# Patient Record
Sex: MALE | Race: WHITE | Employment: FULL TIME | URBAN - METROPOLITAN AREA
[De-identification: names, ages, dates, MRNs, and addresses within clinical notes are randomized per-mention and may not be internally consistent; named-entity substitution may affect disease eponyms.]

---
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
## 2018-04-15 ENCOUNTER — HOSPITAL ENCOUNTER (EMERGENCY)
Age: 18
Discharge: HOME | End: 2018-04-15
Attending: EMERGENCY MEDICINE
Payer: COMMERCIAL

## 2018-04-15 VITALS
HEIGHT: 74 IN | DIASTOLIC BLOOD PRESSURE: 86 MMHG | TEMPERATURE: 98.7 F | RESPIRATION RATE: 17 BRPM | HEART RATE: 75 BPM | WEIGHT: 150 LBS | BODY MASS INDEX: 19.25 KG/M2 | SYSTOLIC BLOOD PRESSURE: 140 MMHG | OXYGEN SATURATION: 99 %

## 2018-04-15 DIAGNOSIS — T50.901A ACCIDENTAL DRUG OVERDOSE, INITIAL ENCOUNTER: Primary | ICD-10-CM

## 2018-04-15 LAB
ACETAMINOPHEN LEVEL: <15 UG/ML (ref 10–30)
ANION GAP SERPL CALCULATED.3IONS-SCNC: 13 MEQ/L (ref 7–13)
BUN BLDV-MCNC: 11 MG/DL (ref 6–20)
CALCIUM SERPL-MCNC: 9.9 MG/DL (ref 8.6–10.2)
CHLORIDE BLD-SCNC: 102 MEQ/L (ref 98–107)
CO2: 26 MEQ/L (ref 22–29)
CREAT SERPL-MCNC: 0.74 MG/DL (ref 0.7–1.2)
GFR AFRICAN AMERICAN: >60
GFR NON-AFRICAN AMERICAN: >60
GLUCOSE BLD-MCNC: 123 MG/DL (ref 74–109)
HCT VFR BLD CALC: 40.5 % (ref 42–52)
HEMOGLOBIN: 13.9 G/DL (ref 14–18)
MCH RBC QN AUTO: 29.9 PG (ref 27–31.3)
MCHC RBC AUTO-ENTMCNC: 34.4 % (ref 33–37)
MCV RBC AUTO: 87 FL (ref 80–100)
PDW BLD-RTO: 13.2 % (ref 11.5–14.5)
PLATELET # BLD: 245 K/UL (ref 130–400)
POTASSIUM SERPL-SCNC: 4 MEQ/L (ref 3.5–5.1)
RBC # BLD: 4.66 M/UL (ref 4.7–6.1)
SALICYLATE, SERUM: <0.3 MG/DL (ref 15–30)
SODIUM BLD-SCNC: 141 MEQ/L (ref 132–144)
WBC # BLD: 10.4 K/UL (ref 4.5–11)

## 2018-04-15 PROCEDURE — 80048 BASIC METABOLIC PNL TOTAL CA: CPT

## 2018-04-15 PROCEDURE — 36415 COLL VENOUS BLD VENIPUNCTURE: CPT

## 2018-04-15 PROCEDURE — 99284 EMERGENCY DEPT VISIT MOD MDM: CPT

## 2018-04-15 PROCEDURE — 85027 COMPLETE CBC AUTOMATED: CPT

## 2018-04-15 PROCEDURE — G0480 DRUG TEST DEF 1-7 CLASSES: HCPCS

## 2018-04-15 ASSESSMENT — ENCOUNTER SYMPTOMS
NAUSEA: 0
COUGH: 0
SHORTNESS OF BREATH: 0
VOMITING: 0

## 2018-04-16 ENCOUNTER — HOSPITAL ENCOUNTER (EMERGENCY)
Age: 18
Discharge: HOME | End: 2018-04-16
Attending: EMERGENCY MEDICINE
Payer: COMMERCIAL

## 2018-04-16 VITALS
RESPIRATION RATE: 16 BRPM | OXYGEN SATURATION: 98 % | TEMPERATURE: 98.3 F | HEART RATE: 78 BPM | HEIGHT: 74 IN | WEIGHT: 150 LBS | DIASTOLIC BLOOD PRESSURE: 66 MMHG | SYSTOLIC BLOOD PRESSURE: 118 MMHG | BODY MASS INDEX: 19.25 KG/M2

## 2018-04-16 DIAGNOSIS — J00 ACUTE NASOPHARYNGITIS: ICD-10-CM

## 2018-04-16 DIAGNOSIS — J02.9 VIRAL PHARYNGITIS: Primary | ICD-10-CM

## 2018-04-16 LAB
RAPID INFLUENZA  B AGN: NEGATIVE
RAPID INFLUENZA A AGN: NEGATIVE
S PYO AG THROAT QL: NEGATIVE

## 2018-04-16 PROCEDURE — 86403 PARTICLE AGGLUT ANTBDY SCRN: CPT

## 2018-04-16 PROCEDURE — 87077 CULTURE AEROBIC IDENTIFY: CPT

## 2018-04-16 PROCEDURE — 87880 STREP A ASSAY W/OPTIC: CPT

## 2018-04-16 PROCEDURE — 87081 CULTURE SCREEN ONLY: CPT

## 2018-04-16 PROCEDURE — 99283 EMERGENCY DEPT VISIT LOW MDM: CPT

## 2018-04-16 RX ORDER — GUAIFENESIN/DEXTROMETHORPHAN 100-10MG/5
5 SYRUP ORAL 3 TIMES DAILY PRN
Qty: 120 ML | Refills: 0 | Status: SHIPPED | OUTPATIENT
Start: 2018-04-16 | End: 2018-04-26

## 2018-04-16 ASSESSMENT — PAIN DESCRIPTION - ORIENTATION
ORIENTATION: LEFT;RIGHT;POSTERIOR
ORIENTATION_2: LEFT;RIGHT;UPPER

## 2018-04-16 ASSESSMENT — PAIN DESCRIPTION - PROGRESSION
CLINICAL_PROGRESSION: GRADUALLY WORSENING
CLINICAL_PROGRESSION_2: NOT CHANGED

## 2018-04-16 ASSESSMENT — PAIN DESCRIPTION - ONSET
ONSET: PROGRESSIVE
ONSET_2: ON-GOING

## 2018-04-16 ASSESSMENT — PAIN DESCRIPTION - LOCATION
LOCATION_2: FACE
LOCATION: HEAD
LOCATION: THROAT

## 2018-04-16 ASSESSMENT — PAIN DESCRIPTION - DESCRIPTORS
DESCRIPTORS_2: SORE
DESCRIPTORS: SORE

## 2018-04-16 ASSESSMENT — ENCOUNTER SYMPTOMS
COUGH: 1
DIARRHEA: 0
SHORTNESS OF BREATH: 0
SORE THROAT: 1
VOMITING: 0

## 2018-04-16 ASSESSMENT — PAIN DESCRIPTION - FREQUENCY: FREQUENCY: CONTINUOUS

## 2018-04-16 ASSESSMENT — PAIN SCALES - GENERAL
PAINLEVEL_OUTOF10: 5
PAINLEVEL_OUTOF10: 5

## 2018-04-16 ASSESSMENT — PAIN DESCRIPTION - DURATION: DURATION_2: CONTINUOUS

## 2018-04-16 ASSESSMENT — PAIN - FUNCTIONAL ASSESSMENT: PAIN_FUNCTIONAL_ASSESSMENT: 0-10

## 2018-04-16 ASSESSMENT — PAIN DESCRIPTION - INTENSITY: RATING_2: 5

## 2018-04-16 ASSESSMENT — PAIN DESCRIPTION - PAIN TYPE: TYPE: ACUTE PAIN

## 2018-04-18 LAB — S PYO THROAT QL CULT: NORMAL

## 2018-04-19 ENCOUNTER — HOSPITAL ENCOUNTER (EMERGENCY)
Age: 18
Discharge: HOME | End: 2018-04-20
Attending: INTERNAL MEDICINE
Payer: COMMERCIAL

## 2018-04-19 DIAGNOSIS — J01.00 ACUTE MAXILLARY SINUSITIS, RECURRENCE NOT SPECIFIED: Primary | ICD-10-CM

## 2018-04-19 DIAGNOSIS — R03.0 ELEVATED BLOOD PRESSURE READING: ICD-10-CM

## 2018-04-19 PROCEDURE — 99283 EMERGENCY DEPT VISIT LOW MDM: CPT

## 2018-04-20 ENCOUNTER — HOSPITAL ENCOUNTER (EMERGENCY)
Age: 18
Discharge: HOME | End: 2018-04-20
Attending: EMERGENCY MEDICINE
Payer: COMMERCIAL

## 2018-04-20 VITALS
HEART RATE: 110 BPM | TEMPERATURE: 98.8 F | RESPIRATION RATE: 16 BRPM | HEIGHT: 74 IN | SYSTOLIC BLOOD PRESSURE: 138 MMHG | BODY MASS INDEX: 19.25 KG/M2 | DIASTOLIC BLOOD PRESSURE: 84 MMHG | WEIGHT: 150 LBS | OXYGEN SATURATION: 99 %

## 2018-04-20 VITALS
TEMPERATURE: 97.9 F | HEIGHT: 74 IN | SYSTOLIC BLOOD PRESSURE: 130 MMHG | WEIGHT: 150 LBS | RESPIRATION RATE: 16 BRPM | DIASTOLIC BLOOD PRESSURE: 73 MMHG | HEART RATE: 88 BPM | BODY MASS INDEX: 19.25 KG/M2 | OXYGEN SATURATION: 100 %

## 2018-04-20 DIAGNOSIS — R11.0 NAUSEA: ICD-10-CM

## 2018-04-20 DIAGNOSIS — F41.1 ANXIETY STATE: Primary | ICD-10-CM

## 2018-04-20 DIAGNOSIS — J01.10 ACUTE NON-RECURRENT FRONTAL SINUSITIS: ICD-10-CM

## 2018-04-20 PROCEDURE — 6360000002 HC RX W HCPCS: Performed by: EMERGENCY MEDICINE

## 2018-04-20 PROCEDURE — 99283 EMERGENCY DEPT VISIT LOW MDM: CPT

## 2018-04-20 RX ORDER — ONDANSETRON 4 MG/1
4 TABLET, ORALLY DISINTEGRATING ORAL ONCE
Status: COMPLETED | OUTPATIENT
Start: 2018-04-20 | End: 2018-04-20

## 2018-04-20 RX ORDER — AMOXICILLIN AND CLAVULANATE POTASSIUM 500; 125 MG/1; MG/1
1 TABLET, FILM COATED ORAL 3 TIMES DAILY
Qty: 30 TABLET | Refills: 0 | Status: SHIPPED | OUTPATIENT
Start: 2018-04-20 | End: 2018-04-30

## 2018-04-20 RX ORDER — ECHINACEA PURPUREA EXTRACT 125 MG
1 TABLET ORAL PRN
Qty: 1 BOTTLE | Refills: 3 | Status: SHIPPED | OUTPATIENT
Start: 2018-04-20 | End: 2022-01-01

## 2018-04-20 RX ORDER — ONDANSETRON 4 MG/1
4 TABLET, ORALLY DISINTEGRATING ORAL EVERY 8 HOURS PRN
Qty: 8 TABLET | Refills: 0 | Status: SHIPPED | OUTPATIENT
Start: 2018-04-20 | End: 2018-04-29

## 2018-04-20 RX ORDER — FLUTICASONE PROPIONATE 50 MCG
1 SPRAY, SUSPENSION (ML) NASAL DAILY
Qty: 1 BOTTLE | Refills: 0 | Status: SHIPPED | OUTPATIENT
Start: 2018-04-20 | End: 2022-01-01

## 2018-04-20 RX ADMIN — ONDANSETRON 4 MG: 4 TABLET, ORALLY DISINTEGRATING ORAL at 20:25

## 2018-04-20 ASSESSMENT — ENCOUNTER SYMPTOMS
EYE REDNESS: 0
SINUS PRESSURE: 0
ABDOMINAL PAIN: 0
SORE THROAT: 0
DIARRHEA: 0
NAUSEA: 1
EYE DISCHARGE: 0
WHEEZING: 0
SHORTNESS OF BREATH: 0
STRIDOR: 0
FACIAL SWELLING: 0
CHEST TIGHTNESS: 0
VOMITING: 0
CHOKING: 0
BACK PAIN: 0
TROUBLE SWALLOWING: 0
COUGH: 0
EYE PAIN: 0
BLOOD IN STOOL: 0
CONSTIPATION: 0
VOICE CHANGE: 0

## 2018-04-20 ASSESSMENT — PAIN DESCRIPTION - ONSET: ONSET: GRADUAL

## 2018-04-20 ASSESSMENT — PAIN DESCRIPTION - FREQUENCY: FREQUENCY: INTERMITTENT

## 2018-04-20 ASSESSMENT — PAIN SCALES - GENERAL: PAINLEVEL_OUTOF10: 6

## 2018-04-20 ASSESSMENT — PAIN DESCRIPTION - DESCRIPTORS: DESCRIPTORS: THROBBING

## 2018-04-29 ENCOUNTER — HOSPITAL ENCOUNTER (EMERGENCY)
Age: 18
Discharge: HOME | End: 2018-04-29
Attending: EMERGENCY MEDICINE
Payer: COMMERCIAL

## 2018-04-29 VITALS
WEIGHT: 150 LBS | RESPIRATION RATE: 16 BRPM | SYSTOLIC BLOOD PRESSURE: 132 MMHG | BODY MASS INDEX: 19.25 KG/M2 | DIASTOLIC BLOOD PRESSURE: 71 MMHG | OXYGEN SATURATION: 100 % | TEMPERATURE: 97.9 F | HEIGHT: 74 IN | HEART RATE: 82 BPM

## 2018-04-29 DIAGNOSIS — Z20.2 STD EXPOSURE: Primary | ICD-10-CM

## 2018-04-29 LAB
BILIRUBIN URINE: NEGATIVE
BLOOD, URINE: NEGATIVE
CLARITY: CLEAR
COLOR: YELLOW
GLUCOSE URINE: NEGATIVE MG/DL
KETONES, URINE: NEGATIVE MG/DL
LEUKOCYTE ESTERASE, URINE: NEGATIVE
NITRITE, URINE: NEGATIVE
PH UA: 5.5 (ref 5–9)
PROTEIN UA: NEGATIVE MG/DL
SPECIFIC GRAVITY UA: >=1.03 (ref 1–1.03)
URINE REFLEX TO CULTURE: NORMAL
UROBILINOGEN, URINE: 0.2 E.U./DL

## 2018-04-29 PROCEDURE — 87591 N.GONORRHOEAE DNA AMP PROB: CPT

## 2018-04-29 PROCEDURE — 81003 URINALYSIS AUTO W/O SCOPE: CPT

## 2018-04-29 PROCEDURE — 6370000000 HC RX 637 (ALT 250 FOR IP): Performed by: EMERGENCY MEDICINE

## 2018-04-29 PROCEDURE — 2500000003 HC RX 250 WO HCPCS: Performed by: EMERGENCY MEDICINE

## 2018-04-29 PROCEDURE — 99283 EMERGENCY DEPT VISIT LOW MDM: CPT

## 2018-04-29 PROCEDURE — 87491 CHLMYD TRACH DNA AMP PROBE: CPT

## 2018-04-29 PROCEDURE — 6360000002 HC RX W HCPCS: Performed by: EMERGENCY MEDICINE

## 2018-04-29 PROCEDURE — 96372 THER/PROPH/DIAG INJ SC/IM: CPT

## 2018-04-29 RX ORDER — CEFTRIAXONE 1 G/1
INJECTION, POWDER, FOR SOLUTION INTRAMUSCULAR; INTRAVENOUS
Status: DISCONTINUED
Start: 2018-04-29 | End: 2018-04-29 | Stop reason: HOSPADM

## 2018-04-29 RX ORDER — AZITHROMYCIN 250 MG/1
1000 TABLET, FILM COATED ORAL DAILY
Status: COMPLETED | OUTPATIENT
Start: 2018-04-29 | End: 2018-04-29

## 2018-04-29 RX ADMIN — AZITHROMYCIN 1000 MG: 250 TABLET, FILM COATED ORAL at 17:07

## 2018-04-29 RX ADMIN — LIDOCAINE HYDROCHLORIDE 250 MG: 10 INJECTION, SOLUTION EPIDURAL; INFILTRATION; INTRACAUDAL; PERINEURAL at 17:14

## 2018-04-29 ASSESSMENT — ENCOUNTER SYMPTOMS
SORE THROAT: 0
APNEA: 0
ABDOMINAL DISTENTION: 0
SINUS PRESSURE: 0
ABDOMINAL PAIN: 0
DIARRHEA: 0
WHEEZING: 0
COLOR CHANGE: 0
CONSTIPATION: 0
PHOTOPHOBIA: 0
VOMITING: 0
SHORTNESS OF BREATH: 0
RHINORRHEA: 0
NAUSEA: 0
BACK PAIN: 0
EYE PAIN: 0
COUGH: 0

## 2018-05-07 LAB
C. TRACHOMATIS DNA ,URINE: NEGATIVE
N. GONORRHOEAE DNA, URINE: NEGATIVE

## 2018-07-07 ENCOUNTER — HOSPITAL ENCOUNTER (EMERGENCY)
Age: 18
Discharge: HOME | End: 2018-07-07
Attending: INTERNAL MEDICINE
Payer: COMMERCIAL

## 2018-07-07 VITALS
WEIGHT: 151 LBS | OXYGEN SATURATION: 97 % | BODY MASS INDEX: 19.38 KG/M2 | RESPIRATION RATE: 19 BRPM | DIASTOLIC BLOOD PRESSURE: 78 MMHG | HEIGHT: 74 IN | HEART RATE: 90 BPM | SYSTOLIC BLOOD PRESSURE: 124 MMHG | TEMPERATURE: 98.6 F

## 2018-07-07 DIAGNOSIS — R03.0 ELEVATED BLOOD PRESSURE READING: ICD-10-CM

## 2018-07-07 DIAGNOSIS — Z72.0 TOBACCO ABUSE: ICD-10-CM

## 2018-07-07 DIAGNOSIS — L25.9 CONTACT DERMATITIS, UNSPECIFIED CONTACT DERMATITIS TYPE, UNSPECIFIED TRIGGER: Primary | ICD-10-CM

## 2018-07-07 PROCEDURE — 99282 EMERGENCY DEPT VISIT SF MDM: CPT

## 2018-07-07 RX ORDER — DIAPER,BRIEF,INFANT-TODD,DISP
EACH MISCELLANEOUS
Qty: 1 TUBE | Refills: 1 | Status: SHIPPED | OUTPATIENT
Start: 2018-07-07 | End: 2018-07-14

## 2018-07-07 ASSESSMENT — PAIN DESCRIPTION - LOCATION: LOCATION: ARM

## 2018-07-07 ASSESSMENT — PAIN DESCRIPTION - PAIN TYPE: TYPE: ACUTE PAIN

## 2018-07-07 ASSESSMENT — PAIN DESCRIPTION - FREQUENCY: FREQUENCY: INTERMITTENT

## 2018-07-07 ASSESSMENT — PAIN SCALES - GENERAL: PAINLEVEL_OUTOF10: 2

## 2018-07-07 ASSESSMENT — PAIN DESCRIPTION - ONSET: ONSET: ON-GOING

## 2018-07-07 ASSESSMENT — PAIN DESCRIPTION - PROGRESSION: CLINICAL_PROGRESSION: NOT CHANGED

## 2018-07-07 ASSESSMENT — PAIN DESCRIPTION - ORIENTATION: ORIENTATION: RIGHT;LEFT

## 2018-07-07 NOTE — ED NOTES
Rash to arms of unknown origin     [address], [address] [place] [address]  07/07/18 1702

## 2018-07-07 NOTE — ED PROVIDER NOTES
reviewed and are negative. Except as noted above the remainder of the review of systems was reviewed and negative. PAST MEDICAL HISTORY     Past Medical History:   Diagnosis Date    Depression     Immune deficiency disorder ([facility]          SURGICAL HISTORY     History reviewed. No pertinent surgical history. CURRENT MEDICATIONS       Discharge Medication List as of 7/7/2018  5:38 PM      CONTINUE these medications which have NOT CHANGED    Details   fluticasone (FLONASE) 50 MCG/ACT nasal spray 1 spray by Nasal route daily, Disp-1 Bottle, R-0Print      sodium chloride (OCEAN) 0.65 % nasal spray 1 spray by Nasal route as needed for Congestion, Disp-1 Bottle, R-3Print      amphetamine-dextroamphetamine (ADDERALL, 20MG,) 20 MG tablet Take 20 mg by mouth daily . Historical Med      loratadine-pseudoephedrine (CLARITIN-D 12HR) 5-120 MG per extended release tablet Take 1 tablet by mouth 2 times daily, Disp-20 tablet, R-0Print             ALLERGIES     Patient has no known allergies. FAMILY HISTORY     History reviewed. No pertinent family history. SOCIAL HISTORY       Social History     Social History    Marital status: Single     Spouse name: N/A    Number of children: N/A    Years of education: N/A     Social History Main Topics    Smoking status: Current Every Day Smoker     Packs/day: 0.25     Years: 5.00     Types: E-Cigarettes    Smokeless tobacco: Never Used    Alcohol use No    Drug use: Yes     Types:  Other-see comments      Comment: \"maddi\" MDMA    Sexual activity: Yes     Partners: Female     Other Topics Concern    None     Social History Narrative    None       SCREENINGS             PHYSICAL EXAM    (up to 7 for level 4, 8 or more for level 5)     ED Triage Vitals [07/07/18 1725]   BP Temp Temp Source Heart Rate Resp SpO2 Height Weight   136/65 98.5 °F (36.9 °C) Oral 91 19 97 % 6' 2\" (1.88 m) 151 lb (68.5 kg)       Physical Exam  Physical Exam   Constitutional:  Appears well-developed and well-nourished. No distress noted. Non toxic in appearance  HENT:     Head: Normocephalic and atraumatic. Mouth/Throat: Oropharynx is clear and mucosa moist.Scattered decay but gingiva are normal in appearance without any evidence of bleeding or petechiae. No oropharyngeal exudate noted. Eyes: Conjunctivae and EOM are normal. No petechiae identified. Pupils are equal, round, and reactive to light. No scleral icterus. Neck: Normal range of motion. Neck supple. No tracheal deviation present. Cardiovascular: Normal rate, regular rhythm, normal heart sounds and intact distal pulses. Exam reveals no gallop or friction rub. No murmur heard. Pulmonary/Chest: Effort normal and breath sounds are symmetric and normal. No respiratory distress. There are no wheezes, rales or rhonchi. No tenderness is exhibited upon palpation of the chest wall. Abdominal: Soft. Bowel sounds are normal. No distension or no mass exhibitted. There is no tenderness, rebound, rigidity or guarding. Genitourinary:   No CVA tenderness   Musculoskeletal: Normal range of motion. No edema, tenderness or deformity. Lymphadenopathy:  No cervical adenopathy. Neurological:   alert and oriented to person, place, and time. Reflexes are normal.  There are no cranial nerve deficits. Normal muscle tone exhibitted. Coordination normal.   Skin: Slightly raised blanching patches of papules on the right proximal forearm, dorsum of the right hand and bilateral wrists, without drainage, bleeding or other exudate. Skin is warm and dry. Multiple simple superficial linear scratches on the dorsum of his bilateral hands. No diaphoresis. No erythema. No pallor. no petechiae or purpura noted. Psychiatric:  normal mood and affect.  Behavior is  normal. Judgment and thought content normal.     DIAGNOSTIC RESULTS     EKG: All EKG's are interpreted by the Emergency Department Physician who either signs or Co-signs this chart in the absence

## 2018-10-06 ENCOUNTER — HOSPITAL ENCOUNTER (EMERGENCY)
Age: 18
Discharge: HOME | End: 2018-10-06
Attending: EMERGENCY MEDICINE
Payer: COMMERCIAL

## 2018-10-06 VITALS
HEIGHT: 74 IN | RESPIRATION RATE: 18 BRPM | HEART RATE: 65 BPM | DIASTOLIC BLOOD PRESSURE: 68 MMHG | OXYGEN SATURATION: 98 % | WEIGHT: 150 LBS | BODY MASS INDEX: 19.25 KG/M2 | TEMPERATURE: 98.6 F | SYSTOLIC BLOOD PRESSURE: 124 MMHG

## 2018-10-06 DIAGNOSIS — R51.9 SINUS HEADACHE: Primary | ICD-10-CM

## 2018-10-06 LAB
AMPHETAMINE SCREEN, URINE: NORMAL
BARBITURATE SCREEN URINE: NORMAL
BENZODIAZEPINE SCREEN, URINE: NORMAL
BILIRUBIN URINE: NEGATIVE
BLOOD, URINE: NEGATIVE
CANNABINOID SCREEN URINE: NORMAL
CLARITY: CLEAR
COCAINE METABOLITE SCREEN URINE: NORMAL
COLOR: YELLOW
GLUCOSE URINE: NEGATIVE MG/DL
KETONES, URINE: NEGATIVE MG/DL
LEUKOCYTE ESTERASE, URINE: NEGATIVE
Lab: NORMAL
NITRITE, URINE: NEGATIVE
OPIATE SCREEN URINE: NORMAL
PH UA: 6 (ref 5–9)
PHENCYCLIDINE SCREEN URINE: NORMAL
PROTEIN UA: NEGATIVE MG/DL
SPECIFIC GRAVITY UA: >=1.03 (ref 1–1.03)
TRICYCLIC, URINE: NORMAL
URINE REFLEX TO CULTURE: NORMAL
UROBILINOGEN, URINE: 0.2 E.U./DL

## 2018-10-06 PROCEDURE — 6370000000 HC RX 637 (ALT 250 FOR IP): Performed by: EMERGENCY MEDICINE

## 2018-10-06 PROCEDURE — 99284 EMERGENCY DEPT VISIT MOD MDM: CPT

## 2018-10-06 PROCEDURE — 81003 URINALYSIS AUTO W/O SCOPE: CPT

## 2018-10-06 PROCEDURE — 80306 DRUG TEST PRSMV INSTRMNT: CPT

## 2018-10-06 PROCEDURE — 6360000002 HC RX W HCPCS: Performed by: EMERGENCY MEDICINE

## 2018-10-06 RX ORDER — CETIRIZINE HYDROCHLORIDE 10 MG/1
10 TABLET ORAL DAILY
Status: DISCONTINUED | OUTPATIENT
Start: 2018-10-06 | End: 2018-10-06

## 2018-10-06 RX ORDER — ONDANSETRON 4 MG/1
4 TABLET, ORALLY DISINTEGRATING ORAL ONCE
Status: COMPLETED | OUTPATIENT
Start: 2018-10-06 | End: 2018-10-06

## 2018-10-06 RX ORDER — CETIRIZINE HYDROCHLORIDE 10 MG/1
10 TABLET ORAL ONCE
Status: COMPLETED | OUTPATIENT
Start: 2018-10-06 | End: 2018-10-06

## 2018-10-06 RX ORDER — IBUPROFEN 400 MG/1
800 TABLET ORAL ONCE
Status: COMPLETED | OUTPATIENT
Start: 2018-10-06 | End: 2018-10-06

## 2018-10-06 RX ORDER — BUTALBITAL, ASPIRIN, AND CAFFEINE 325; 50; 40 MG/1; MG/1; MG/1
1 CAPSULE ORAL EVERY 4 HOURS PRN
Qty: 12 CAPSULE | Refills: 0 | Status: SHIPPED | OUTPATIENT
Start: 2018-10-06 | End: 2018-10-09

## 2018-10-06 RX ADMIN — CETIRIZINE HYDROCHLORIDE 10 MG: 10 TABLET, FILM COATED ORAL at 03:22

## 2018-10-06 RX ADMIN — ONDANSETRON 4 MG: 4 TABLET, ORALLY DISINTEGRATING ORAL at 03:22

## 2018-10-06 RX ADMIN — IBUPROFEN 800 MG: 400 TABLET, FILM COATED ORAL at 03:22

## 2018-10-06 ASSESSMENT — ENCOUNTER SYMPTOMS
EYE PAIN: 0
CHOKING: 0
EYE DISCHARGE: 0
COUGH: 0
ABDOMINAL DISTENTION: 0
VOMITING: 1
CHEST TIGHTNESS: 0
PHOTOPHOBIA: 0
ABDOMINAL PAIN: 0
NAUSEA: 0
TROUBLE SWALLOWING: 0
BACK PAIN: 0
DIARRHEA: 0
BLOOD IN STOOL: 0
CONSTIPATION: 0
EYE REDNESS: 0
SINUS PRESSURE: 0
COLOR CHANGE: 0
SORE THROAT: 0
SHORTNESS OF BREATH: 0
ANAL BLEEDING: 0
FACIAL SWELLING: 0
VOICE CHANGE: 0
EYE ITCHING: 0
WHEEZING: 0
STRIDOR: 0
RHINORRHEA: 0

## 2018-10-06 ASSESSMENT — PAIN DESCRIPTION - LOCATION: LOCATION: HEAD

## 2018-10-06 ASSESSMENT — PAIN SCALES - GENERAL
PAINLEVEL_OUTOF10: 0
PAINLEVEL_OUTOF10: 5

## 2018-10-06 ASSESSMENT — PAIN DESCRIPTION - FREQUENCY
FREQUENCY: CONTINUOUS
FREQUENCY: INTERMITTENT

## 2018-10-06 ASSESSMENT — PAIN DESCRIPTION - PAIN TYPE: TYPE: ACUTE PAIN

## 2018-10-06 ASSESSMENT — PAIN DESCRIPTION - DESCRIPTORS: DESCRIPTORS: THROBBING

## 2018-11-09 ENCOUNTER — HOSPITAL ENCOUNTER (EMERGENCY)
Age: 18
Discharge: HOME | End: 2018-11-09
Attending: EMERGENCY MEDICINE
Payer: COMMERCIAL

## 2018-11-09 VITALS
WEIGHT: 143 LBS | HEART RATE: 87 BPM | SYSTOLIC BLOOD PRESSURE: 120 MMHG | BODY MASS INDEX: 18.95 KG/M2 | OXYGEN SATURATION: 99 % | RESPIRATION RATE: 16 BRPM | DIASTOLIC BLOOD PRESSURE: 77 MMHG | HEIGHT: 73 IN | TEMPERATURE: 98.1 F

## 2018-11-09 DIAGNOSIS — H52.13 MYOPIA OF BOTH EYES: ICD-10-CM

## 2018-11-09 DIAGNOSIS — R51.9 INTRACTABLE EPISODIC HEADACHE, UNSPECIFIED HEADACHE TYPE: Primary | ICD-10-CM

## 2018-11-09 PROCEDURE — 99283 EMERGENCY DEPT VISIT LOW MDM: CPT

## 2018-11-09 ASSESSMENT — ENCOUNTER SYMPTOMS
CHEST TIGHTNESS: 0
ABDOMINAL PAIN: 0
EYE PAIN: 0
SORE THROAT: 0
CONSTIPATION: 0
FACIAL SWELLING: 0
EYE DISCHARGE: 0
EYE REDNESS: 0
CHOKING: 0
STRIDOR: 0
DIARRHEA: 0
WHEEZING: 0
BACK PAIN: 0
VOICE CHANGE: 0
SINUS PRESSURE: 0
BLOOD IN STOOL: 0
COUGH: 0
TROUBLE SWALLOWING: 0
VOMITING: 0
SHORTNESS OF BREATH: 0

## 2018-11-09 NOTE — ED PROVIDER NOTES
none    LABS:  Labs Reviewed - No data to display    All other labs were within normal range or not returned as of this dictation. EMERGENCY DEPARTMENT COURSE and DIFFERENTIAL DIAGNOSIS/MDM:   Vitals:    Vitals:    11/09/18 1705   BP: 120/77   Pulse: 87   Resp: 16   Temp: 98.1 °F (36.7 °C)   TempSrc: Oral   SpO2: 99%   Weight: 143 lb (64.9 kg)   Height: 6' 1\" (1.854 m)         MDM  Number of Diagnoses or Management Options  Intractable episodic headache, unspecified headache type: established and improving  Myopia of both eyes:   Diagnosis management comments: Patient about hemoglobin level checked and it is negative at this time no exposure      CRITICAL CARE TIME   Total Critical Care time was  minutes, excluding separately reportableprocedures. There was a high probability of clinicallysignificant/life threatening deterioration in the patient's condition which required my urgent intervention. CONSULTS:  None    PROCEDURES:  Unless otherwise noted below, none     Procedures    FINAL IMPRESSION      1. Intractable episodic headache, unspecified headache type    2.  Myopia of both eyes          DISPOSITION/PLAN   DISPOSITION Decision To Discharge 11/09/2018 05:21:51 PM      PATIENT REFERRED TO:  Chelita Francis MD  [address] [address]  [phone]    In 1 week        DISCHARGE MEDICATIONS:  New Prescriptions    No medications on file          (Please note that portions of this note were completed with a voice recognition program.  Efforts were made to edit the dictations but occasionally words are mis-transcribed.)    Dl Serna MD (electronically signed)  Attending Emergency Physician       Dl Serna MD  11/09/18 3845

## 2019-01-04 ENCOUNTER — HOSPITAL ENCOUNTER (EMERGENCY)
Age: 19
Discharge: HOME | End: 2019-01-04
Attending: EMERGENCY MEDICINE
Payer: COMMERCIAL

## 2019-01-04 VITALS
OXYGEN SATURATION: 97 % | HEIGHT: 74 IN | DIASTOLIC BLOOD PRESSURE: 68 MMHG | HEART RATE: 103 BPM | WEIGHT: 152 LBS | RESPIRATION RATE: 20 BRPM | SYSTOLIC BLOOD PRESSURE: 126 MMHG | BODY MASS INDEX: 19.51 KG/M2 | TEMPERATURE: 98.6 F

## 2019-01-04 DIAGNOSIS — S61.212A LACERATION OF RIGHT MIDDLE FINGER WITHOUT FOREIGN BODY WITHOUT DAMAGE TO NAIL, INITIAL ENCOUNTER: Primary | ICD-10-CM

## 2019-01-04 PROCEDURE — 6360000002 HC RX W HCPCS: Performed by: EMERGENCY MEDICINE

## 2019-01-04 PROCEDURE — 90471 IMMUNIZATION ADMIN: CPT | Performed by: EMERGENCY MEDICINE

## 2019-01-04 PROCEDURE — 6370000000 HC RX 637 (ALT 250 FOR IP): Performed by: EMERGENCY MEDICINE

## 2019-01-04 PROCEDURE — 99282 EMERGENCY DEPT VISIT SF MDM: CPT

## 2019-01-04 PROCEDURE — 90715 TDAP VACCINE 7 YRS/> IM: CPT | Performed by: EMERGENCY MEDICINE

## 2019-01-04 PROCEDURE — 12001 RPR S/N/AX/GEN/TRNK 2.5CM/<: CPT

## 2019-01-04 RX ORDER — IBUPROFEN 600 MG/1
600 TABLET ORAL EVERY 6 HOURS PRN
Qty: 30 TABLET | Refills: 0 | Status: SHIPPED | OUTPATIENT
Start: 2019-01-04 | End: 2022-01-23 | Stop reason: SDUPTHER

## 2019-01-04 RX ORDER — GINSENG 100 MG
CAPSULE ORAL ONCE
Status: COMPLETED | OUTPATIENT
Start: 2019-01-04 | End: 2019-01-04

## 2019-01-04 RX ADMIN — BACITRACIN: 500 OINTMENT TOPICAL at 12:19

## 2019-01-04 RX ADMIN — TETANUS TOXOID, REDUCED DIPHTHERIA TOXOID AND ACELLULAR PERTUSSIS VACCINE, ADSORBED 0.5 ML: 5; 2.5; 8; 8; 2.5 SUSPENSION INTRAMUSCULAR at 12:19

## 2019-01-04 ASSESSMENT — ENCOUNTER SYMPTOMS
COUGH: 0
CONSTIPATION: 0
BACK PAIN: 0
VOICE CHANGE: 0
CHOKING: 0
SORE THROAT: 0
TROUBLE SWALLOWING: 0
SHORTNESS OF BREATH: 0
BLOOD IN STOOL: 0
WHEEZING: 0
EYE REDNESS: 0
EYE PAIN: 0
DIARRHEA: 0
VOMITING: 0
SINUS PRESSURE: 0
FACIAL SWELLING: 0
STRIDOR: 0
ABDOMINAL PAIN: 0
CHEST TIGHTNESS: 0
EYE DISCHARGE: 0

## 2019-01-08 ENCOUNTER — OFFICE VISIT (OUTPATIENT)
Dept: FAMILY MEDICINE CLINIC | Age: 19
End: 2019-01-08
Payer: COMMERCIAL

## 2019-01-08 VITALS
RESPIRATION RATE: 20 BRPM | DIASTOLIC BLOOD PRESSURE: 70 MMHG | SYSTOLIC BLOOD PRESSURE: 114 MMHG | HEART RATE: 87 BPM | BODY MASS INDEX: 20.53 KG/M2 | OXYGEN SATURATION: 98 % | HEIGHT: 74 IN | TEMPERATURE: 97 F | WEIGHT: 160 LBS

## 2019-01-08 DIAGNOSIS — R10.13 EPIGASTRIC PAIN: Primary | ICD-10-CM

## 2019-01-08 DIAGNOSIS — R07.9 CHEST PAIN, UNSPECIFIED TYPE: ICD-10-CM

## 2019-01-08 DIAGNOSIS — K92.1 BLOOD IN STOOL: ICD-10-CM

## 2019-01-08 PROCEDURE — 99213 OFFICE O/P EST LOW 20 MIN: CPT | Performed by: NURSE PRACTITIONER

## 2019-01-08 RX ORDER — PANTOPRAZOLE SODIUM 20 MG/1
40 TABLET, DELAYED RELEASE ORAL DAILY
Qty: 30 TABLET | Refills: 0 | Status: SHIPPED | OUTPATIENT
Start: 2019-01-08 | End: 2022-01-01

## 2019-01-08 ASSESSMENT — ENCOUNTER SYMPTOMS
SHORTNESS OF BREATH: 0
WHEEZING: 0
COUGH: 0
BLOOD IN STOOL: 1
CONSTIPATION: 1
ABDOMINAL PAIN: 1
NAUSEA: 0
VOMITING: 0
ANAL BLEEDING: 1
DIARRHEA: 0

## 2019-01-08 ASSESSMENT — PATIENT HEALTH QUESTIONNAIRE - PHQ9
2. FEELING DOWN, DEPRESSED OR HOPELESS: 0
SUM OF ALL RESPONSES TO PHQ QUESTIONS 1-9: 0
1. LITTLE INTEREST OR PLEASURE IN DOING THINGS: 0
SUM OF ALL RESPONSES TO PHQ QUESTIONS 1-9: 0
SUM OF ALL RESPONSES TO PHQ9 QUESTIONS 1 & 2: 0

## 2019-01-12 ENCOUNTER — HOSPITAL ENCOUNTER (EMERGENCY)
Age: 19
Discharge: HOME | End: 2019-01-12
Attending: EMERGENCY MEDICINE
Payer: COMMERCIAL

## 2019-01-12 ENCOUNTER — APPOINTMENT (OUTPATIENT)
Dept: GENERAL RADIOLOGY | Age: 19
End: 2019-01-12
Payer: COMMERCIAL

## 2019-01-12 VITALS
DIASTOLIC BLOOD PRESSURE: 80 MMHG | RESPIRATION RATE: 18 BRPM | TEMPERATURE: 98 F | HEIGHT: 74 IN | BODY MASS INDEX: 20.53 KG/M2 | SYSTOLIC BLOOD PRESSURE: 126 MMHG | WEIGHT: 160 LBS | HEART RATE: 78 BPM | OXYGEN SATURATION: 98 %

## 2019-01-12 DIAGNOSIS — K29.00 ACUTE GASTRITIS WITHOUT HEMORRHAGE, UNSPECIFIED GASTRITIS TYPE: ICD-10-CM

## 2019-01-12 DIAGNOSIS — R10.84 GENERALIZED ABDOMINAL PAIN: Primary | ICD-10-CM

## 2019-01-12 LAB
ALBUMIN SERPL-MCNC: 4.7 G/DL (ref 3.9–4.9)
ALP BLD-CCNC: 80 U/L (ref 35–104)
ALT SERPL-CCNC: 21 U/L (ref 0–41)
AMPHETAMINE SCREEN, URINE: NORMAL
AMYLASE: 66 U/L (ref 28–100)
ANION GAP SERPL CALCULATED.3IONS-SCNC: 11 MEQ/L (ref 7–13)
AST SERPL-CCNC: 22 U/L (ref 0–40)
BACTERIA: NORMAL /HPF
BARBITURATE SCREEN URINE: NORMAL
BASOPHILS ABSOLUTE: 0 K/UL (ref 0–0.2)
BASOPHILS RELATIVE PERCENT: 0.2 %
BENZODIAZEPINE SCREEN, URINE: NORMAL
BILIRUB SERPL-MCNC: 0.5 MG/DL (ref 0–1.2)
BILIRUBIN URINE: NEGATIVE
BLOOD, URINE: NORMAL
BUN BLDV-MCNC: 11 MG/DL (ref 6–20)
CALCIUM SERPL-MCNC: 9.6 MG/DL (ref 8.6–10.2)
CANNABINOID SCREEN URINE: NORMAL
CHLORIDE BLD-SCNC: 100 MEQ/L (ref 98–107)
CLARITY: CLEAR
CO2: 29 MEQ/L (ref 22–29)
COCAINE METABOLITE SCREEN URINE: NORMAL
COLOR: YELLOW
CREAT SERPL-MCNC: 0.84 MG/DL (ref 0.7–1.2)
EOSINOPHILS ABSOLUTE: 0 K/UL (ref 0–0.7)
EOSINOPHILS RELATIVE PERCENT: 0.5 %
EPITHELIAL CELLS, UA: NORMAL /HPF
GFR AFRICAN AMERICAN: >60
GFR NON-AFRICAN AMERICAN: >60
GLOBULIN: 3 G/DL (ref 2.3–3.5)
GLUCOSE BLD-MCNC: 101 MG/DL (ref 74–109)
GLUCOSE URINE: NEGATIVE MG/DL
HCT VFR BLD CALC: 43.5 % (ref 42–52)
HEMOGLOBIN: 14.6 G/DL (ref 14–18)
KETONES, URINE: NEGATIVE MG/DL
LEUKOCYTE ESTERASE, URINE: NEGATIVE
LIPASE: 20 U/L (ref 13–60)
LYMPHOCYTES ABSOLUTE: 0.8 K/UL (ref 1–4.8)
LYMPHOCYTES RELATIVE PERCENT: 13.6 %
Lab: NORMAL
MCH RBC QN AUTO: 28.8 PG (ref 27–31.3)
MCHC RBC AUTO-ENTMCNC: 33.6 % (ref 33–37)
MCV RBC AUTO: 85.7 FL (ref 80–100)
MONOCYTES ABSOLUTE: 0.8 K/UL (ref 0.2–0.8)
MONOCYTES RELATIVE PERCENT: 13.2 %
NEUTROPHILS ABSOLUTE: 4.2 K/UL (ref 1.4–6.5)
NEUTROPHILS RELATIVE PERCENT: 72.5 %
NITRITE, URINE: NEGATIVE
OPIATE SCREEN URINE: NORMAL
PDW BLD-RTO: 13.3 % (ref 11.5–14.5)
PH UA: 5.5 (ref 5–9)
PHENCYCLIDINE SCREEN URINE: NORMAL
PLATELET # BLD: 203 K/UL (ref 130–400)
POTASSIUM SERPL-SCNC: 4.3 MEQ/L (ref 3.5–5.1)
PROTEIN UA: NEGATIVE MG/DL
RBC # BLD: 5.08 M/UL (ref 4.7–6.1)
RBC UA: NORMAL /HPF (ref 0–2)
SODIUM BLD-SCNC: 140 MEQ/L (ref 132–144)
SPECIFIC GRAVITY UA: 1.02 (ref 1–1.03)
TOTAL PROTEIN: 7.7 G/DL (ref 6.4–8.1)
TRICYCLIC, URINE: NORMAL
URINE REFLEX TO CULTURE: YES
UROBILINOGEN, URINE: 0.2 E.U./DL
WBC # BLD: 5.8 K/UL (ref 4.5–11)
WBC UA: NORMAL /HPF (ref 0–5)

## 2019-01-12 PROCEDURE — 81001 URINALYSIS AUTO W/SCOPE: CPT

## 2019-01-12 PROCEDURE — 82150 ASSAY OF AMYLASE: CPT

## 2019-01-12 PROCEDURE — 80306 DRUG TEST PRSMV INSTRMNT: CPT

## 2019-01-12 PROCEDURE — 2580000003 HC RX 258: Performed by: EMERGENCY MEDICINE

## 2019-01-12 PROCEDURE — 74022 RADEX COMPL AQT ABD SERIES: CPT

## 2019-01-12 PROCEDURE — 99284 EMERGENCY DEPT VISIT MOD MDM: CPT

## 2019-01-12 PROCEDURE — 96374 THER/PROPH/DIAG INJ IV PUSH: CPT

## 2019-01-12 PROCEDURE — 85025 COMPLETE CBC W/AUTO DIFF WBC: CPT

## 2019-01-12 PROCEDURE — S0028 INJECTION, FAMOTIDINE, 20 MG: HCPCS | Performed by: EMERGENCY MEDICINE

## 2019-01-12 PROCEDURE — 96375 TX/PRO/DX INJ NEW DRUG ADDON: CPT

## 2019-01-12 PROCEDURE — 87086 URINE CULTURE/COLONY COUNT: CPT

## 2019-01-12 PROCEDURE — 80053 COMPREHEN METABOLIC PANEL: CPT

## 2019-01-12 PROCEDURE — 83690 ASSAY OF LIPASE: CPT

## 2019-01-12 PROCEDURE — 6360000002 HC RX W HCPCS: Performed by: EMERGENCY MEDICINE

## 2019-01-12 RX ORDER — ONDANSETRON 4 MG/1
4 TABLET, ORALLY DISINTEGRATING ORAL EVERY 8 HOURS PRN
Qty: 10 TABLET | Refills: 0 | Status: SHIPPED | OUTPATIENT
Start: 2019-01-12 | End: 2022-01-01 | Stop reason: SDUPTHER

## 2019-01-12 RX ORDER — 0.9 % SODIUM CHLORIDE 0.9 %
1000 INTRAVENOUS SOLUTION INTRAVENOUS ONCE
Status: COMPLETED | OUTPATIENT
Start: 2019-01-12 | End: 2019-01-12

## 2019-01-12 RX ORDER — SODIUM CHLORIDE 0.9 % (FLUSH) 0.9 %
3 SYRINGE (ML) INJECTION EVERY 8 HOURS
Status: DISCONTINUED | OUTPATIENT
Start: 2019-01-12 | End: 2019-01-12 | Stop reason: HOSPADM

## 2019-01-12 RX ORDER — OMEPRAZOLE 20 MG/1
20 CAPSULE, DELAYED RELEASE ORAL DAILY
Qty: 30 CAPSULE | Refills: 0 | Status: SHIPPED | OUTPATIENT
Start: 2019-01-12 | End: 2022-01-01

## 2019-01-12 RX ORDER — DIPHENHYDRAMINE HYDROCHLORIDE 50 MG/ML
25 INJECTION INTRAMUSCULAR; INTRAVENOUS ONCE
Status: COMPLETED | OUTPATIENT
Start: 2019-01-12 | End: 2019-01-12

## 2019-01-12 RX ORDER — METOCLOPRAMIDE HYDROCHLORIDE 5 MG/ML
10 INJECTION INTRAMUSCULAR; INTRAVENOUS ONCE
Status: COMPLETED | OUTPATIENT
Start: 2019-01-12 | End: 2019-01-12

## 2019-01-12 RX ADMIN — SODIUM CHLORIDE 1000 ML: 9 INJECTION, SOLUTION INTRAVENOUS at 18:54

## 2019-01-12 RX ADMIN — DIPHENHYDRAMINE HYDROCHLORIDE 25 MG: 50 INJECTION INTRAMUSCULAR; INTRAVENOUS at 18:54

## 2019-01-12 RX ADMIN — Medication 3 ML: at 18:55

## 2019-01-12 RX ADMIN — FAMOTIDINE 20 MG: 10 INJECTION, SOLUTION INTRAVENOUS at 18:53

## 2019-01-12 RX ADMIN — METOCLOPRAMIDE 10 MG: 5 INJECTION, SOLUTION INTRAMUSCULAR; INTRAVENOUS at 18:54

## 2019-01-12 ASSESSMENT — ENCOUNTER SYMPTOMS
BLOOD IN STOOL: 0
CHOKING: 0
EYE DISCHARGE: 0
COUGH: 0
CHEST TIGHTNESS: 0
VOMITING: 0
SHORTNESS OF BREATH: 0
STRIDOR: 0
SINUS PRESSURE: 0
SORE THROAT: 0
TROUBLE SWALLOWING: 0
CONSTIPATION: 0
BACK PAIN: 0
EYE REDNESS: 0
EYE PAIN: 0
WHEEZING: 0
FACIAL SWELLING: 0
DIARRHEA: 0
VOICE CHANGE: 0
NAUSEA: 1
ABDOMINAL PAIN: 1

## 2019-01-12 ASSESSMENT — PAIN DESCRIPTION - DESCRIPTORS: DESCRIPTORS: ACHING

## 2019-01-12 ASSESSMENT — PAIN DESCRIPTION - LOCATION: LOCATION: ABDOMEN

## 2019-01-12 ASSESSMENT — PAIN SCALES - GENERAL: PAINLEVEL_OUTOF10: 4

## 2019-01-14 LAB — URINE CULTURE, ROUTINE: NORMAL

## 2022-01-01 ENCOUNTER — HOSPITAL ENCOUNTER (EMERGENCY)
Age: 22
Discharge: HOME | End: 2022-01-01
Attending: EMERGENCY MEDICINE
Payer: COMMERCIAL

## 2022-01-01 VITALS
RESPIRATION RATE: 18 BRPM | BODY MASS INDEX: 21.82 KG/M2 | SYSTOLIC BLOOD PRESSURE: 134 MMHG | HEIGHT: 74 IN | HEART RATE: 67 BPM | WEIGHT: 170 LBS | DIASTOLIC BLOOD PRESSURE: 79 MMHG | TEMPERATURE: 97.1 F | OXYGEN SATURATION: 99 %

## 2022-01-01 DIAGNOSIS — R11.2 NAUSEA, VOMITING AND DIARRHEA: Primary | ICD-10-CM

## 2022-01-01 DIAGNOSIS — R19.7 NAUSEA, VOMITING AND DIARRHEA: Primary | ICD-10-CM

## 2022-01-01 LAB
INFLUENZA A BY PCR: NEGATIVE
INFLUENZA B BY PCR: NEGATIVE

## 2022-01-01 PROCEDURE — 99283 EMERGENCY DEPT VISIT LOW MDM: CPT

## 2022-01-01 PROCEDURE — 87502 INFLUENZA DNA AMP PROBE: CPT

## 2022-01-01 PROCEDURE — 6370000000 HC RX 637 (ALT 250 FOR IP): Performed by: EMERGENCY MEDICINE

## 2022-01-01 RX ORDER — ONDANSETRON 4 MG/1
4 TABLET, ORALLY DISINTEGRATING ORAL EVERY 8 HOURS PRN
Qty: 20 TABLET | Refills: 0 | Status: SHIPPED | OUTPATIENT
Start: 2022-01-01 | End: 2022-01-08

## 2022-01-01 RX ORDER — DICYCLOMINE HYDROCHLORIDE 10 MG/1
10 CAPSULE ORAL EVERY 6 HOURS PRN
Qty: 20 CAPSULE | Refills: 0 | Status: SHIPPED | OUTPATIENT
Start: 2022-01-01 | End: 2022-01-06

## 2022-01-01 RX ORDER — FAMOTIDINE 20 MG/1
20 TABLET, FILM COATED ORAL 2 TIMES DAILY
Qty: 60 TABLET | Refills: 0 | Status: SHIPPED | OUTPATIENT
Start: 2022-01-01 | End: 2022-01-31

## 2022-01-01 RX ORDER — ONDANSETRON 4 MG/1
4 TABLET, ORALLY DISINTEGRATING ORAL ONCE
Status: COMPLETED | OUTPATIENT
Start: 2022-01-01 | End: 2022-01-01

## 2022-01-01 RX ADMIN — ONDANSETRON 4 MG: 4 TABLET, ORALLY DISINTEGRATING ORAL at 15:05

## 2022-01-01 ASSESSMENT — PAIN DESCRIPTION - DESCRIPTORS: DESCRIPTORS: ACHING

## 2022-01-01 ASSESSMENT — PAIN DESCRIPTION - LOCATION: LOCATION: CHEST

## 2022-01-01 ASSESSMENT — PAIN DESCRIPTION - PAIN TYPE: TYPE: ACUTE PAIN

## 2022-01-01 ASSESSMENT — PAIN SCALES - GENERAL: PAINLEVEL_OUTOF10: 6

## 2022-01-01 NOTE — ED PROVIDER NOTES
eMERGENCY dEPARTMENT eNCOUnter      [facility]    Chief Complaint   Patient presents with    Other     Patient is 3 weeks post positive COVID. Patient states the new symptoms started yesterday and states that his chest feels like a ache. Patient also states that he feels like his heart races once in a while.  Abdominal Pain    Leg Pain       HPI    Leroy Doyle is a 19 y.o. male with history of anxiety, depression, immune deficiency disorder who presentsto ED from home  By  private carWith complaint of nausea vomiting diarrhea  Onset 1 day  Intensity of symptoms mild  Patient was diagnosed with Covid 2 weeks ago. Patient started having nausea vomiting diarrhea starting last night. Patient denies any fever or chills. Patient denies any shortness of breath or chest pain. There is no blood in the vomitus or stools. Patient denies any recent travel      PAST MEDICAL HISTORY    Past Medical History:   Diagnosis Date    Anxiety     Depression     Immune deficiency disorder ([facility]     Willebrand-Juergens disease ([facility]        SURGICAL HISTORY    History reviewed. No pertinent surgical history. CURRENT MEDICATIONS    Current Outpatient Rx   Medication Sig Dispense Refill    dicyclomine (BENTYL) 10 MG capsule Take 1 capsule by mouth every 6 hours as needed (cramps) 20 capsule 0    famotidine (PEPCID) 20 MG tablet Take 1 tablet by mouth 2 times daily 60 tablet 0    ondansetron (ZOFRAN-ODT) 4 MG disintegrating tablet Place 1 tablet under the tongue every 8 hours as needed for Nausea or Vomiting May Sub regular tablet (non-ODT) if insurance does not cover ODT. 20 tablet 0    amphetamine-dextroamphetamine (ADDERALL, 20MG,) 20 MG tablet Take 20 mg by mouth daily .       ibuprofen (IBU) 600 MG tablet Take 1 tablet by mouth every 6 hours as needed for Pain 30 tablet 0    butalbital-aspirin-caffeine (FIORINAL) -40 MG capsule Take 1 capsule by mouth every 4 hours as needed for Headaches for up to 3 days.. 12 capsule 0    loratadine-pseudoephedrine (CLARITIN-D 12HR) 5-120 MG per extended release tablet Take 1 tablet by mouth 2 times daily 20 tablet 0       ALLERGIES    No Known Allergies    FAMILY HISTORY    History reviewed. No pertinent family history. SOCIAL HISTORY    Social History     Socioeconomic History    Marital status: Single     Spouse name: None    Number of children: None    Years of education: None    Highest education level: None   Occupational History    None   Tobacco Use    Smoking status: Current Every Day Smoker     Packs/day: 0.25     Years: 5.00     Pack years: 1.25     Types: E-Cigarettes    Smokeless tobacco: Never Used   Vaping Use    Vaping Use: Never used   Substance and Sexual Activity    Alcohol use: No    Drug use: Yes     Types: Other-see comments     Comment: \"maddi\" MDMA    Sexual activity: Yes     Partners: Female   Other Topics Concern    None   Social History Narrative    None     Social Determinants of Health     Financial Resource Strain:     Difficulty of Paying Living Expenses: Not on file   Food Insecurity:     Worried About Running Out of Food in the Last Year: Not on file    Carleen of Food in the Last Year: Not on file   Transportation Needs:     Lack of Transportation (Medical): Not on file    Lack of Transportation (Non-Medical):  Not on file   Physical Activity:     Days of Exercise per Week: Not on file    Minutes of Exercise per Session: Not on file   Stress:     Feeling of Stress : Not on file   Social Connections:     Frequency of Communication with Friends and Family: Not on file    Frequency of Social Gatherings with Friends and Family: Not on file    Attends Judaism Services: Not on file    Active Member of Clubs or Organizations: Not on file    Attends Club or Organization Meetings: Not on file    Marital Status: Not on file   Intimate Partner Violence:     Fear of Current or Ex-Partner: Not on file    Emotionally Abused: Not on file    Physically Abused: Not on file    Sexually Abused: Not on file   Housing Stability:     Unable to Pay for Housing in the Last Year: Not on file    Number of Places Lived in the Last Year: Not on file    Unstable Housing in the Last Year: Not on file       REVIEW OF SYSTEMS    Constitutional:  Denies fever, chills, weight loss or weakness   Eyes:  Denies photophobia or discharge   HENT:  Denies sore throat or ear pain   Respiratory:  Denies cough or shortness of breath   Cardiovascular:  Denies chest pain, palpitations or swelling   GI: Complains of abdominal cramping, nausea, vomiting, and diarrhea   Musculoskeletal:  Denies back pain   Skin:  Denies rash   Neurologic:  Denies headache, focal weakness or sensory changes   Endocrine:  Denies polyuria or polydypsia   Lymphatic:  Denies swollen glands   Psychiatric:  Denies depression, suicidal ideation or homicidal ideation   All systems negative except as marked. PHYSICAL EXAM    VITAL SIGNS: /79   Pulse 67   Temp 97.1 °F (36.2 °C) (Temporal)   Resp 18   Ht 6' 2\" (1.88 m)   Wt 170 lb (77.1 kg)   SpO2 99%   BMI 21.83 kg/m²    Constitutional:  Well developed, Well nourished, No acute distress, Non-toxic appearance. HENT:  Normocephalic, Atraumatic, Bilateral external ears normal, Oropharynx moist, No oral exudates, Nose normal. Neck- Normal range of motion, No tenderness, Supple, No stridor. Eyes:  PERRL, EOMI, Conjunctiva normal, No discharge. Respiratory:  Normal breath sounds, No respiratory distress, No wheezing, No chest tenderness. Cardiovascular:  Normal heart rate, Normal rhythm, No murmurs, No rubs, No gallops. GI:  Bowel sounds normal, Soft, mild epigastric tenderness, No masses, No pulsatile masses. No right lower quadrant tenderness, no rebound or rigidity,   : No CVA tenderness. Musculoskeletal:  Intact distal pulses, No edema, No tenderness, No cyanosis, No clubbing.  Good range of motion in all major joints. No tenderness to palpation or major deformities noted. Back- No tenderness. Integument:  Warm, Dry, No erythema, No rash. Lymphatic:  No lymphadenopathy noted. Neurologic:  Alert & oriented x 3, Normal motor function, Normal sensory function, No focal deficits noted. Psychiatric:  Affect normal, Judgment normal, Mood normal.       REEVALUATION   Patient was updated the results of labs . Feels better. Tolerating p.o. Labs  Labs Reviewed   RAPID INFLUENZA A/B ANTIGENS   COVID-19             Summation      Patient Course:     ED Medications administered this visit:    Medications   ondansetron (ZOFRAN-ODT) disintegrating tablet 4 mg (4 mg Oral Given 1/1/22 6014)       New Prescriptions from this visit:    New Prescriptions    DICYCLOMINE (BENTYL) 10 MG CAPSULE    Take 1 capsule by mouth every 6 hours as needed (cramps)    FAMOTIDINE (PEPCID) 20 MG TABLET    Take 1 tablet by mouth 2 times daily    ONDANSETRON (ZOFRAN-ODT) 4 MG DISINTEGRATING TABLET    Place 1 tablet under the tongue every 8 hours as needed for Nausea or Vomiting May Sub regular tablet (non-ODT) if insurance does not cover ODT. Follow-up:  Rebecca Trejo MD  [address] [address]  [phone]    Schedule an appointment as soon as possible for a visit in 3 days  Follow up within 3 days, Return to ED sooner if symptoms worsen        Final Impression:   1.  Nausea, vomiting and diarrhea               (Please note that portions of this note were completed with a voice recognition program.  Efforts were made to edit the dictations but occasionally words are mis-transcribed.)          Cathy Martinez MD  01/01/22 7112

## 2022-01-01 NOTE — LETTER
[facility]  [address] [place] [address]  Phone: [phone]               January 1, 2022    Patient: Lisa Casanova   YOB: 2000   Date of Visit: 1/1/2022       To Whom It May Concern:    Lisa Casanova was seen and treated in our emergency department on 1/1/2022. He may return to work on 1/3/2022.       Sincerely,       Rosi Miller RN         Signature:__________________________________

## 2022-01-01 NOTE — ED TRIAGE NOTES
Patient positive for covid x 3 weeks at [facility]. Pt was feeling better ,but now upset stomach, chills nausea and vomiting.

## 2022-01-23 ENCOUNTER — APPOINTMENT (OUTPATIENT)
Dept: GENERAL RADIOLOGY | Age: 22
End: 2022-01-23
Payer: COMMERCIAL

## 2022-01-23 ENCOUNTER — HOSPITAL ENCOUNTER (EMERGENCY)
Age: 22
Discharge: HOME | End: 2022-01-23
Attending: EMERGENCY MEDICINE
Payer: COMMERCIAL

## 2022-01-23 VITALS
SYSTOLIC BLOOD PRESSURE: 121 MMHG | WEIGHT: 160 LBS | HEIGHT: 74 IN | TEMPERATURE: 98.6 F | BODY MASS INDEX: 20.53 KG/M2 | OXYGEN SATURATION: 99 % | HEART RATE: 70 BPM | DIASTOLIC BLOOD PRESSURE: 71 MMHG | RESPIRATION RATE: 16 BRPM

## 2022-01-23 DIAGNOSIS — S61.211A LACERATION OF LEFT INDEX FINGER WITHOUT FOREIGN BODY WITHOUT DAMAGE TO NAIL, INITIAL ENCOUNTER: Primary | ICD-10-CM

## 2022-01-23 PROCEDURE — 99283 EMERGENCY DEPT VISIT LOW MDM: CPT

## 2022-01-23 PROCEDURE — 6370000000 HC RX 637 (ALT 250 FOR IP): Performed by: EMERGENCY MEDICINE

## 2022-01-23 PROCEDURE — 12001 RPR S/N/AX/GEN/TRNK 2.5CM/<: CPT

## 2022-01-23 PROCEDURE — 73140 X-RAY EXAM OF FINGER(S): CPT

## 2022-01-23 RX ORDER — IBUPROFEN 400 MG/1
400 TABLET ORAL ONCE
Status: DISCONTINUED | OUTPATIENT
Start: 2022-01-23 | End: 2022-01-23 | Stop reason: HOSPADM

## 2022-01-23 RX ORDER — CEPHALEXIN 500 MG/1
500 CAPSULE ORAL ONCE
Status: COMPLETED | OUTPATIENT
Start: 2022-01-23 | End: 2022-01-23

## 2022-01-23 RX ORDER — CEPHALEXIN 500 MG/1
500 CAPSULE ORAL 2 TIMES DAILY
Qty: 14 CAPSULE | Refills: 0 | Status: SHIPPED | OUTPATIENT
Start: 2022-01-23 | End: 2022-01-30

## 2022-01-23 RX ORDER — IBUPROFEN 400 MG/1
400 TABLET ORAL EVERY 6 HOURS PRN
Qty: 30 TABLET | Refills: 0 | Status: SHIPPED | OUTPATIENT
Start: 2022-01-23 | End: 2022-08-23

## 2022-01-23 RX ADMIN — CEPHALEXIN 500 MG: 500 CAPSULE ORAL at 04:31

## 2022-01-23 ASSESSMENT — PAIN SCALES - GENERAL: PAINLEVEL_OUTOF10: 0

## 2022-01-23 NOTE — ED TRIAGE NOTES
Pt. Presents to ED with complaints of laceration to left index finger on the knuckle. Bleeding controlled. Pt. Cut on glass place in the bottom of the sink.

## 2022-01-23 NOTE — LETTER
Rosalinda Hanley was seen and treated in our emergency department on 1/23/2022. He may return to work on 01/26/2022. If you have any questions or concerns, please don't hesitate to call.       Stefany Lui MD

## 2022-01-27 NOTE — ED PROVIDER NOTES
eMERGENCY dEPARTMENT eNCOUnter      [address] [facility]    Chief Complaint   Patient presents with    Hand Laceration     left pointer finger cut open on broken plate in sink around 2200       HPI    Romina Stone is a 19 y.o. male with history of anxiety, depression, immune deficiency disorder who presentsto ED from home   By private car  With complaint of left index finger laceration   Onset 6 hours ago  Intensity of symptoms mild   Patient was washing dishes and lacerated his left index finger. Patient is left-handed. Patient denies any trouble with flexion or extension of the finger. Patient denies any numbness in the distal part of the finger. Patient cleaned the wound and try to control the bleeding at home. Patient's tetanus is up-to-date. PAST MEDICAL HISTORY    Past Medical History:   Diagnosis Date    Anxiety     Depression     Immune deficiency disorder ([facility]     Willebrand-Juergens disease ([facility]        SURGICAL HISTORY    History reviewed. No pertinent surgical history. CURRENT MEDICATIONS    Current Outpatient Rx   Medication Sig Dispense Refill    cephALEXin (KEFLEX) 500 MG capsule Take 1 capsule by mouth 2 times daily for 7 days 14 capsule 0    ibuprofen (IBU) 400 MG tablet Take 1 tablet by mouth every 6 hours as needed for Pain 30 tablet 0    amphetamine-dextroamphetamine (ADDERALL, 20MG,) 20 MG tablet Take 20 mg by mouth daily .  dicyclomine (BENTYL) 10 MG capsule Take 1 capsule by mouth every 6 hours as needed (cramps) 20 capsule 0    famotidine (PEPCID) 20 MG tablet Take 1 tablet by mouth 2 times daily 60 tablet 0    butalbital-aspirin-caffeine (FIORINAL) -40 MG capsule Take 1 capsule by mouth every 4 hours as needed for Headaches for up to 3 days. . 12 capsule 0    loratadine-pseudoephedrine (CLARITIN-D 12HR) 5-120 MG per extended release tablet Take 1 tablet by mouth 2 times daily 20 tablet 0       ALLERGIES    No Known Allergies    FAMILY HISTORY    History reviewed. No pertinent family history. SOCIAL HISTORY    Social History     Socioeconomic History    Marital status: Single     Spouse name: None    Number of children: None    Years of education: None    Highest education level: None   Occupational History    None   Tobacco Use    Smoking status: Current Every Day Smoker     Packs/day: 0.25     Years: 5.00     Pack years: 1.25     Types: E-Cigarettes    Smokeless tobacco: Never Used   Vaping Use    Vaping Use: Never used   Substance and Sexual Activity    Alcohol use: No    Drug use: Not Currently     Types: Other-see comments     Comment: \"maddi\" MDMA    Sexual activity: Yes     Partners: Female   Other Topics Concern    None   Social History Narrative    None     Social Determinants of Health     Financial Resource Strain:     Difficulty of Paying Living Expenses: Not on file   Food Insecurity:     Worried About Running Out of Food in the Last Year: Not on file    Carleen of Food in the Last Year: Not on file   Transportation Needs:     Lack of Transportation (Medical): Not on file    Lack of Transportation (Non-Medical):  Not on file   Physical Activity:     Days of Exercise per Week: Not on file    Minutes of Exercise per Session: Not on file   Stress:     Feeling of Stress : Not on file   Social Connections:     Frequency of Communication with Friends and Family: Not on file    Frequency of Social Gatherings with Friends and Family: Not on file    Attends Restorationism Services: Not on file    Active Member of Clubs or Organizations: Not on file    Attends Club or Organization Meetings: Not on file    Marital Status: Not on file   Intimate Partner Violence:     Fear of Current or Ex-Partner: Not on file    Emotionally Abused: Not on file    Physically Abused: Not on file    Sexually Abused: Not on file   Housing Stability:     Unable to Pay for Housing in the Last Year: Not on file    Number of [place] in the Last Year: Not on file    Unstable Housing in the Last Year: Not on file       REVIEW OF SYSTEMS    Constitutional:  Denies fever, chills, weight loss or weakness   Eyes:  Denies photophobia or discharge   HENT:  Denies sore throat or ear pain   Respiratory:  Denies cough or shortness of breath   Cardiovascular:  Denies chest pain, palpitations or swelling   GI:  Denies abdominal pain, nausea, vomiting, or diarrhea   Musculoskeletal: Complains of left index finger laceration, denies back pain   Skin:  Denies rash   Neurologic:  Denies headache, focal weakness or sensory changes   Endocrine:  Denies polyuria or polydypsia   Lymphatic:  Denies swollen glands   Psychiatric:  Denies depression, suicidal ideation or homicidal ideation   All systems negative except as marked. PHYSICAL EXAM    VITAL SIGNS: /71   Pulse 70   Temp 98.6 °F (37 °C) (Temporal)   Resp 16   Ht 6' 2\" (1.88 m)   Wt 160 lb (72.6 kg)   SpO2 99%   BMI 20.54 kg/m²    Constitutional:  Well developed, Well nourished, No acute distress, Non-toxic appearance. HENT:  Normocephalic, Atraumatic, Bilateral external ears normal, Oropharynx moist, No oral exudates, Nose normal. Neck- Normal range of motion, No tenderness, Supple, No stridor. Eyes:  PERRL, EOMI, Conjunctiva normal, No discharge. Respiratory:  Normal breath sounds, No respiratory distress, No wheezing, No chest tenderness. Cardiovascular:  Normal heart rate, Normal rhythm, No murmurs, No rubs, No gallops. GI:  Bowel sounds normal, Soft, No tenderness, No masses, No pulsatile masses. :  No CVA tenderness. Musculoskeletal: Left index finger-laceration present at the index finger on the proximal knuckle area, bleeding is controlled, flexion and extension maintained, no foreign body identified, distal neurovascular intact. Intact distal pulses, No edema, No tenderness, No cyanosis, No clubbing. Good range of motion in all major joints.  No tenderness to palpation or major deformities noted. Back- No tenderness. Integument:  Warm, Dry, No erythema, No rash. Lymphatic:  No lymphadenopathy noted. Neurologic:  Alert & oriented x 3, Normal motor function, Normal sensory function, No focal deficits noted. Psychiatric:  Affect normal, Judgment normal, Mood normal.         RADIOLOGY    XR FINGER LEFT (MIN 2 VIEWS)   Final Result      Soft tissue laceration. No radiopaque foreign body or acute osseous findings. REEVALUATION   Patient was updated the results of  Radiology. Pain control adequate     PROCEDURES  Procedure Name: Laceration Repair  Location: Left index finger  Pre-Procedure Diagnosis: Laceration  Post-Procedure Diagnosis: Repaired Laceration  Informed consent was obtained before procedure started. PROCEDURE:  The appropriate timeout was taken. The area was prepped and draped in the usual sterile fashion. The wound was copiously irrigated. Superglue was used to close the laceration. Estimated blood loss was less than 1 mL. A dressing was applied, splint was provided, to the area and anticipatory guidance, as well as standard post-procedure care, was explained. Return precautions are given. The patient tolerated the procedure well without complications. Labs  Labs Reviewed - No data to display          Summation      Patient Course:     ED Medications administered this visit:    Medications   cephALEXin (KEFLEX) capsule 500 mg (500 mg Oral Given 1/23/22 0431)       New Prescriptions from this visit:    Discharge Medication List as of 1/23/2022  4:25 AM      START taking these medications    Details   cephALEXin (KEFLEX) 500 MG capsule Take 1 capsule by mouth 2 times daily for 7 days, Disp-14 capsule, R-0Print      ibuprofen (IBU) 400 MG tablet Take 1 tablet by mouth every 6 hours as needed for Pain, Disp-30 tablet, R-0Print             Follow-up:  Ray Goodwin MD  [address] [address]  [phone]    Call in 1 day          Final Impression:   1. Laceration of left index finger without foreign body without damage to nail, initial encounter               (Please note that portions of this note were completed with a voice recognition program.  Efforts were made to edit the dictations but occasionally words are mis-transcribed.)            Mo Suarez MD  01/27/22 0885

## 2022-08-23 ENCOUNTER — APPOINTMENT (OUTPATIENT)
Dept: GENERAL RADIOLOGY | Age: 22
End: 2022-08-23
Payer: COMMERCIAL

## 2022-08-23 ENCOUNTER — HOSPITAL ENCOUNTER (EMERGENCY)
Age: 22
Discharge: HOME | End: 2022-08-23
Payer: COMMERCIAL

## 2022-08-23 VITALS
OXYGEN SATURATION: 98 % | RESPIRATION RATE: 16 BRPM | SYSTOLIC BLOOD PRESSURE: 118 MMHG | HEART RATE: 88 BPM | HEIGHT: 74 IN | TEMPERATURE: 98.5 F | WEIGHT: 160 LBS | BODY MASS INDEX: 20.53 KG/M2 | DIASTOLIC BLOOD PRESSURE: 69 MMHG

## 2022-08-23 DIAGNOSIS — S63.502A SPRAIN OF LEFT WRIST, INITIAL ENCOUNTER: Primary | ICD-10-CM

## 2022-08-23 PROCEDURE — 73120 X-RAY EXAM OF HAND: CPT

## 2022-08-23 PROCEDURE — 73110 X-RAY EXAM OF WRIST: CPT

## 2022-08-23 PROCEDURE — 99283 EMERGENCY DEPT VISIT LOW MDM: CPT

## 2022-08-23 PROCEDURE — 6370000000 HC RX 637 (ALT 250 FOR IP)

## 2022-08-23 RX ORDER — HYDROCODONE BITARTRATE AND ACETAMINOPHEN 5; 325 MG/1; MG/1
1 TABLET ORAL ONCE
Status: DISCONTINUED | OUTPATIENT
Start: 2022-08-23 | End: 2022-08-23

## 2022-08-23 RX ORDER — IBUPROFEN 600 MG/1
600 TABLET ORAL ONCE
Status: COMPLETED | OUTPATIENT
Start: 2022-08-23 | End: 2022-08-23

## 2022-08-23 RX ORDER — IBUPROFEN 600 MG/1
600 TABLET ORAL EVERY 8 HOURS PRN
Qty: 40 TABLET | Refills: 0 | Status: SHIPPED | OUTPATIENT
Start: 2022-08-23

## 2022-08-23 RX ADMIN — IBUPROFEN 600 MG: 600 TABLET ORAL at 19:58

## 2022-08-23 ASSESSMENT — PAIN - FUNCTIONAL ASSESSMENT: PAIN_FUNCTIONAL_ASSESSMENT: 0-10

## 2022-08-23 ASSESSMENT — ENCOUNTER SYMPTOMS
SHORTNESS OF BREATH: 0
NAUSEA: 0
VOMITING: 0
BACK PAIN: 0
ABDOMINAL PAIN: 0
COUGH: 0
DIARRHEA: 0
SORE THROAT: 0

## 2022-08-23 ASSESSMENT — PAIN DESCRIPTION - ORIENTATION: ORIENTATION: LEFT

## 2022-08-23 ASSESSMENT — PAIN DESCRIPTION - PAIN TYPE: TYPE: ACUTE PAIN

## 2022-08-23 ASSESSMENT — PAIN SCALES - GENERAL: PAINLEVEL_OUTOF10: 8

## 2022-08-23 ASSESSMENT — PAIN DESCRIPTION - LOCATION: LOCATION: WRIST

## 2022-08-23 ASSESSMENT — PAIN DESCRIPTION - DESCRIPTORS: DESCRIPTORS: ACHING

## 2022-08-23 NOTE — ED PROVIDER NOTES
[facility] ED  eMERGENCYdEPARTMENT eNCOUnter      Pt Name: Cory Hallman  MRN: [id number]  [place] 2000of evaluation: 2022  Provider:KHANH Randolph CNP    CHIEF COMPLAINT       Chief Complaint   Patient presents with    Wrist Injury     Left          HISTORY OF PRESENT ILLNESS  (Location/Symptom, Timing/Onset, Context/Setting, Quality, Duration, Modifying Factors, Severity.)   Cory Hallman is a 19 y.o. male with a hx of  Willebrand-Juergens disease, anxiety, and depression who presents to the emergency department department with complaints of left wrist/hand pain after falling off his skateboard today on his way to work about 3 hours ago. He states some fell off a tree and got stuck under the wheel of his skateboard causing him to fall off. He complains of left wrist/hand pain 8/10 achy. He is left handed and works at [employer]. He has increased pain when he attempts to rotate his wrist. He has not taken anything for the pain today. Denies any head injury or trauma. Denies any CP, SOB, fever or chills. HPI    Nursing Notes were reviewed and I agree. REVIEW OF SYSTEMS    (2-9 systems for level 4, 10 or more for level 5)     Review of Systems   Constitutional:  Negative for activity change, chills and fever. HENT:  Negative for ear pain and sore throat. Eyes:  Negative for visual disturbance. Respiratory:  Negative for cough and shortness of breath. Cardiovascular:  Negative for chest pain, palpitations and leg swelling. Gastrointestinal:  Negative for abdominal pain, diarrhea, nausea and vomiting. Genitourinary:  Negative for dysuria. Musculoskeletal:  Positive for arthralgias (left wrist). Negative for back pain. Skin:  Negative for rash. Neurological:  Negative for dizziness and weakness. as noted above the remainder of the review of systems was reviewed and negative.        PAST MEDICAL HISTORY     Past Medical History:   Diagnosis Date    Anxiety Depression     Immune deficiency disorder ([facility] [address]     Willebrand-Juergens disease ([facility] [address]          SURGICAL HISTORY     History reviewed. No pertinent surgical history. CURRENT MEDICATIONS       Previous Medications    AMPHETAMINE-DEXTROAMPHETAMINE (ADDERALL) 20 MG TABLET    Take 20 mg by mouth daily . BUTALBITAL-ASPIRIN-CAFFEINE (FIORINAL) -40 MG CAPSULE    Take 1 capsule by mouth every 4 hours as needed for Headaches for up to 3 days. Low[place] Copping DICYCLOMINE (BENTYL) 10 MG CAPSULE    Take 1 capsule by mouth every 6 hours as needed (cramps)    FAMOTIDINE (PEPCID) 20 MG TABLET    Take 1 tablet by mouth 2 times daily    LORATADINE-PSEUDOEPHEDRINE (CLARITIN-D 12HR) 5-120 MG PER EXTENDED RELEASE TABLET    Take 1 tablet by mouth 2 times daily       ALLERGIES     Patient has no known allergies. HISTORY     History reviewed. No pertinent family history. SOCIAL HISTORY       Social History     Socioeconomic History    Marital status: Single     Spouse name: None    Number of children: None    Years of education: None    Highest education level: None   Tobacco Use    Smoking status: Former     Packs/day: 0.25     Years: 5.00     Pack years: 1.25     Types: E-Cigarettes, Cigarettes    Smokeless tobacco: Never   Vaping Use    Vaping Use: Never used   Substance and Sexual Activity    Alcohol use: No    Drug use: Not Currently     Types: Other-see comments     Comment: \"maddi\" MDMA       SCREENINGS    [place] Coma Scale  Eye Opening: Spontaneous  Best Verbal Response: Oriented  Best Motor Response: Obeys commands  [place] Coma Scale Score: 15      PHYSICAL EXAM    (up to 7 forlevel 4, 8 or more for level 5)     ED Triage Vitals   BP Temp Temp src Pulse Resp SpO2 Height Weight   -- -- -- -- -- -- -- --       Physical Exam  Vitals and nursing note reviewed. Constitutional:       Appearance: He is well-developed. HENT:      Head: Normocephalic.       Right Ear: External ear normal.      Left Ear: External ear normal. Eyes:      Conjunctiva/sclera: Conjunctivae normal.      Pupils: Pupils are equal, round, and reactive to light. Cardiovascular:      Rate and Rhythm: Normal rate and regular rhythm. Pulses: Normal pulses. Heart sounds: Normal heart sounds. No murmur heard. No friction rub. Pulmonary:      Effort: Pulmonary effort is normal.      Breath sounds: Normal breath sounds. Abdominal:      General: Bowel sounds are normal. There is no distension. Palpations: Abdomen is soft. Tenderness: There is no abdominal tenderness. Musculoskeletal:         General: Swelling (left wrist) and tenderness (left wrist) present. Normal range of motion. Left wrist: Swelling and tenderness present. No deformity, bony tenderness, snuff box tenderness or crepitus. Normal range of motion. Normal pulse. Cervical back: Normal range of motion and neck supple. Skin:     General: Skin is warm and dry. Capillary Refill: Capillary refill takes less than 2 seconds. Findings: Bruising (workman side of left hand) present. Neurological:      Mental Status: He is alert and oriented to person, place, and time. Psychiatric:         Mood and Affect: Mood normal.         Thought Content: Thought content normal.         DIAGNOSTIC RESULTS     RADIOLOGY:   Non-plain film images such as CT, Ultrasound and MRI are read by the radiologist. Plain radiographic images are visualized and preliminarilyinterpreted by KHANH Dobson CNP with the below findings:        Interpretation per the Radiologist below, if available at the time of this note:    XR WRIST LEFT (MIN 3 VIEWS)    (Results Pending)   XR HAND LEFT (2 VIEWS)    (Results Pending)       LABS:  Labs Reviewed - No data to display    All other labs were within normal range or not returnedas of this dictation.     EMERGENCYDEPARTMENT COURSE and DIFFERENTIAL DIAGNOSIS/MDM:   Vitals:    Vitals:    08/23/22 1948   BP: (!) 161/95   Pulse: 88   Resp: 16 Temp: 98.5 °F (36.9 °C)   TempSrc: Oral   SpO2: 98%   Weight: 160 lb (72.6 kg)   Height: 6' 2\" (1.88 m)       REASSESSMENT            [facility]  [address] 19year old male presents with left wrist/ left hand pain. Patient afebrile and hemodynamically stable. Medicated patient with Motrin PO, offered [place] PO patient refused. Ice applied per [employer]. Xray left hand/wrist shows no obvious acute process. Had attending Dr. Steph Chase read xray. Suspect patient has left wrist sprain. Patient pain improved post ice and Motrin. Discussed results with patient, Dx, Tx, Rx,  and follow up. Patient placed in velcro thumb spica and states understanding of RICE procedure. Rx Motrin given. Patient DC home with no signs or symptoms of pain or distress noted. PROCEDURES:    Procedures      FINAL IMPRESSION      1.  Sprain of left wrist, initial encounter Stable         DISPOSITION/PLAN   DISPOSITION Decision To Discharge 08/23/2022 08:24:49 PM      PATIENT REFERRED TO:  Marcelino Martin MD  [place] [id number]    Call in 1 day      DISCHARGE MEDICATIONS:  New Prescriptions    IBUPROFEN (IBU) 600 MG TABLET    Take 1 tablet by mouth every 8 hours as needed for Pain       (Please note that portions of this note were completed with a voice recognition program.  Efforts were made to edit the dictations but occasionally words are mis-transcribed.)    Maci Taveras, [id number], KHANH - AURE  08/23/22 2028

## 2022-08-23 NOTE — ED TRIAGE NOTES
Left wrist pain, fell off skateboard earlier today. Painful ROM to left wrist, no obvious deformity. A/0 x3, ambulatory, resps even and unlabored on room air.

## 2022-08-24 NOTE — ED NOTES
Pt discharged per physician order, Pt denies questions at this time. Pt ambulated to lobby with steady gait. No distress noted. Follow up appointment, and prescriptions discussed with patient.        Noemi Vivas RN  08/23/22 2032

## 2023-02-18 ENCOUNTER — APPOINTMENT (OUTPATIENT)
Dept: GENERAL RADIOLOGY | Age: 23
End: 2023-02-18
Payer: COMMERCIAL

## 2023-02-18 ENCOUNTER — HOSPITAL ENCOUNTER (EMERGENCY)
Age: 23
Discharge: HOME | End: 2023-02-18
Attending: EMERGENCY MEDICINE | Admitting: EMERGENCY MEDICINE
Payer: COMMERCIAL

## 2023-02-18 VITALS
RESPIRATION RATE: 16 BRPM | WEIGHT: 160 LBS | HEART RATE: 93 BPM | BODY MASS INDEX: 20.53 KG/M2 | OXYGEN SATURATION: 100 % | SYSTOLIC BLOOD PRESSURE: 136 MMHG | HEIGHT: 74 IN | DIASTOLIC BLOOD PRESSURE: 91 MMHG | TEMPERATURE: 97.6 F

## 2023-02-18 DIAGNOSIS — U07.1 COVID-19 VIRUS INFECTION: Primary | ICD-10-CM

## 2023-02-18 LAB
INFLUENZA A BY PCR: NEGATIVE
INFLUENZA B BY PCR: NEGATIVE
SARS-COV-2, NAAT: DETECTED

## 2023-02-18 PROCEDURE — 71045 X-RAY EXAM CHEST 1 VIEW: CPT

## 2023-02-18 PROCEDURE — 87635 SARS-COV-2 COVID-19 AMP PRB: CPT

## 2023-02-18 PROCEDURE — 99284 EMERGENCY DEPT VISIT MOD MDM: CPT

## 2023-02-18 PROCEDURE — 87502 INFLUENZA DNA AMP PROBE: CPT

## 2023-02-19 NOTE — ED PROVIDER NOTES
CC/HPI: 53-year-old male to the emergency department chief complaint of cough. Patient states the cough began gradually 4 days ago. Nonproductive. No fever or chills. Denies chest pain but feels a little short of breath. No recent travel. Denies calf pain or swelling. Patient states his significant other is tested positive for COVID and also her parents were recently positive for COVID. Patient states he has had 1 dose of COVID vaccination has also been positive for COVID before      VITALS/PMH/PSH: Reviewed per nurses notes    REVIEW OF SYSTEMS: As in chief complaint history of present illness, otherwise all other systems are reviewed and negative the total 10 systems reviewed    PHYSICAL EXAM:  GEN: Pt alert and oriented, no acute distress. No coughing noted. Did not appear dyspneic. HEENT:         Normocephalic/Atramatic        PERRL, EOMI       Throat non-edematous. No erythema noted. No exudates noted. Moist membranes  NECK: Nontender, no signs of trauma, no lymphadenopathy  HEART: Reg S1/S2, without murmer, rub or gallop  LUNGS: Clear to auscultation bilaterally, respirations even and unlabored  MUSCULOSKELETAL/EXTREMITITES:  No signs of trauma, cyanosis or edema. No calf swelling or tenderness  LYMPH: no peripheral lympadenopathy noted  SKIN:  Warm & dry, no rash  NEUROLOGIC:  Alert and oriented. Speech clear    Medical decision making/ED course;  Patient main stable throughout the course of his emergency department stay. COVID testing was positive. Chest x-ray was obtained and interpreted by me as showing no obvious signs of acute pulmonary disease. Final Clinical impression;  1) COVID-19 viral infection    Disposition/plan; patient discharged home in stable condition given discharge instructions on COVID. Patient is to return immediately for any worsening and or changes to symptoms. Over the counter Tylenol and/or ibuprofen as needed for discomfort.   Patient voiced understanding and agreement with treatment plan.      Imtiaz Cuevas DO  02/19/23 [id number]

## 2023-06-26 ENCOUNTER — APPOINTMENT (OUTPATIENT)
Dept: GENERAL RADIOLOGY | Age: 23
End: 2023-06-26
Payer: COMMERCIAL

## 2023-06-26 ENCOUNTER — HOSPITAL ENCOUNTER (EMERGENCY)
Age: 23
Discharge: HOME | End: 2023-06-26
Attending: EMERGENCY MEDICINE
Payer: COMMERCIAL

## 2023-06-26 VITALS
RESPIRATION RATE: 16 BRPM | DIASTOLIC BLOOD PRESSURE: 89 MMHG | BODY MASS INDEX: 19.25 KG/M2 | HEIGHT: 74 IN | TEMPERATURE: 98.2 F | WEIGHT: 150 LBS | SYSTOLIC BLOOD PRESSURE: 131 MMHG | HEART RATE: 87 BPM | OXYGEN SATURATION: 99 %

## 2023-06-26 DIAGNOSIS — J06.9 VIRAL UPPER RESPIRATORY TRACT INFECTION: Primary | ICD-10-CM

## 2023-06-26 LAB
INFLUENZA A BY PCR: NEGATIVE
INFLUENZA B BY PCR: NEGATIVE
SARS-COV-2 RDRP RESP QL NAA+PROBE: NOT DETECTED

## 2023-06-26 PROCEDURE — 87502 INFLUENZA DNA AMP PROBE: CPT

## 2023-06-26 PROCEDURE — 71045 X-RAY EXAM CHEST 1 VIEW: CPT

## 2023-06-26 PROCEDURE — 87635 SARS-COV-2 COVID-19 AMP PRB: CPT

## 2023-06-26 PROCEDURE — 99284 EMERGENCY DEPT VISIT MOD MDM: CPT

## 2023-06-26 PROCEDURE — 6370000000 HC RX 637 (ALT 250 FOR IP): Performed by: EMERGENCY MEDICINE

## 2023-06-26 RX ORDER — GUAIFENESIN 600 MG/1
1200 TABLET, EXTENDED RELEASE ORAL 2 TIMES DAILY
Qty: 40 TABLET | Refills: 0 | Status: SHIPPED | OUTPATIENT
Start: 2023-06-26 | End: 2023-07-06

## 2023-06-26 RX ORDER — KETOROLAC TROMETHAMINE 10 MG/1
10 TABLET, FILM COATED ORAL EVERY 6 HOURS PRN
Qty: 20 TABLET | Refills: 0 | Status: SHIPPED | OUTPATIENT
Start: 2023-06-26

## 2023-06-26 RX ORDER — ACETAMINOPHEN 500 MG
1000 TABLET ORAL
Status: COMPLETED | OUTPATIENT
Start: 2023-06-26 | End: 2023-06-26

## 2023-06-26 RX ADMIN — ACETAMINOPHEN 1000 MG: 500 TABLET ORAL at 23:16

## 2023-06-26 ASSESSMENT — ENCOUNTER SYMPTOMS
WHEEZING: 0
BACK PAIN: 0
RHINORRHEA: 0
CONSTIPATION: 0
COLOR CHANGE: 0
APNEA: 0
NAUSEA: 0
DIARRHEA: 0
COUGH: 1
VOMITING: 0
PHOTOPHOBIA: 0
ABDOMINAL PAIN: 0
EYE PAIN: 0
ABDOMINAL DISTENTION: 0
SORE THROAT: 0
SINUS PRESSURE: 0
SHORTNESS OF BREATH: 0

## 2023-06-26 ASSESSMENT — LIFESTYLE VARIABLES
HOW MANY STANDARD DRINKS CONTAINING ALCOHOL DO YOU HAVE ON A TYPICAL DAY: 1 OR 2
HOW OFTEN DO YOU HAVE A DRINK CONTAINING ALCOHOL: MONTHLY OR LESS

## 2023-06-26 ASSESSMENT — PAIN DESCRIPTION - DESCRIPTORS: DESCRIPTORS: ACHING

## 2023-06-26 ASSESSMENT — PAIN - FUNCTIONAL ASSESSMENT: PAIN_FUNCTIONAL_ASSESSMENT: 0-10

## 2024-01-26 ENCOUNTER — HOSPITAL ENCOUNTER (EMERGENCY)
Age: 24
Discharge: HOME | End: 2024-01-26
Attending: EMERGENCY MEDICINE
Payer: COMMERCIAL

## 2024-01-26 VITALS
BODY MASS INDEX: 22.84 KG/M2 | OXYGEN SATURATION: 99 % | HEIGHT: 74 IN | TEMPERATURE: 97.9 F | HEART RATE: 96 BPM | RESPIRATION RATE: 16 BRPM | SYSTOLIC BLOOD PRESSURE: 146 MMHG | WEIGHT: 178 LBS | DIASTOLIC BLOOD PRESSURE: 92 MMHG

## 2024-01-26 DIAGNOSIS — T78.40XA ALLERGIC REACTION, INITIAL ENCOUNTER: ICD-10-CM

## 2024-01-26 DIAGNOSIS — R21 RASH AND OTHER NONSPECIFIC SKIN ERUPTION: Primary | ICD-10-CM

## 2024-01-26 PROCEDURE — 99283 EMERGENCY DEPT VISIT LOW MDM: CPT

## 2024-01-26 RX ORDER — HYDROXYZINE HYDROCHLORIDE 25 MG/1
25 TABLET, FILM COATED ORAL EVERY 8 HOURS PRN
Qty: 30 TABLET | Refills: 0 | Status: SHIPPED | OUTPATIENT
Start: 2024-01-26 | End: 2024-02-05

## 2024-01-26 RX ORDER — METHYLPREDNISOLONE ACETATE 40 MG/ML
40 INJECTION, SUSPENSION INTRA-ARTICULAR; INTRALESIONAL; INTRAMUSCULAR; SOFT TISSUE ONCE
Status: DISCONTINUED | OUTPATIENT
Start: 2024-01-26 | End: 2024-01-26 | Stop reason: HOSPADM

## 2024-01-26 ASSESSMENT — ENCOUNTER SYMPTOMS
STRIDOR: 0
ABDOMINAL PAIN: 0
SINUS PRESSURE: 0
SORE THROAT: 0
CHOKING: 0
CONSTIPATION: 0
EYE REDNESS: 0
FACIAL SWELLING: 0
WHEEZING: 0
DIARRHEA: 0
TROUBLE SWALLOWING: 0
EYE DISCHARGE: 0
VOICE CHANGE: 0
SHORTNESS OF BREATH: 0
COUGH: 0
EYE PAIN: 0
BLOOD IN STOOL: 0
BACK PAIN: 0
CHEST TIGHTNESS: 0

## 2024-01-26 ASSESSMENT — PAIN - FUNCTIONAL ASSESSMENT: PAIN_FUNCTIONAL_ASSESSMENT: NONE - DENIES PAIN

## 2024-01-26 ASSESSMENT — LIFESTYLE VARIABLES
HOW MANY STANDARD DRINKS CONTAINING ALCOHOL DO YOU HAVE ON A TYPICAL DAY: PATIENT DOES NOT DRINK
HOW OFTEN DO YOU HAVE A DRINK CONTAINING ALCOHOL: NEVER

## 2024-01-26 NOTE — ED PROVIDER NOTES
intervention.  ONSULTS:  None    PROCEDURES:  Unless otherwise noted below, none     Procedures    FINAL IMPRESSION      1. Rash and other nonspecific skin eruption    2. Allergic reaction, initial encounter          DISPOSITION/PLAN   DISPOSITION Discharge - Pending Orders Complete 01/26/2024 04:44:27 PM      PATIENT REFERRED TO:  Lit Mendez MD  [address] [place] [address]  [phone]    In 2 days  If symptoms worsen      DISCHARGE MEDICATIONS:  New Prescriptions    HYDROXYZINE HCL (ATARAX) 25 MG TABLET    Take 1 tablet by mouth every 8 hours as needed for Itching          (Please note that portions of this note were completed with a voice recognition program.  Efforts were made to edit the dictations but occasionally words are mis-transcribed.)    Jacquelin Gregg MD (electronically signed)  Attending Emergency Physician        Jacquelin Gregg MD  01/26/24 0941

## 2024-08-21 ENCOUNTER — HOSPITAL ENCOUNTER (EMERGENCY)
Age: 24
Discharge: HOME | End: 2024-08-21

## 2024-08-21 VITALS
OXYGEN SATURATION: 97 % | HEART RATE: 86 BPM | WEIGHT: 190 LBS | DIASTOLIC BLOOD PRESSURE: 84 MMHG | TEMPERATURE: 97.9 F | BODY MASS INDEX: 24.38 KG/M2 | HEIGHT: 74 IN | RESPIRATION RATE: 16 BRPM | SYSTOLIC BLOOD PRESSURE: 127 MMHG

## 2024-08-21 DIAGNOSIS — B34.9 VIRAL ILLNESS: Primary | ICD-10-CM

## 2024-08-21 LAB
INFLUENZA A BY PCR: NEGATIVE
INFLUENZA B BY PCR: NEGATIVE
SARS-COV-2 RDRP RESP QL NAA+PROBE: NOT DETECTED
STREP GRP A PCR: NEGATIVE

## 2024-08-21 PROCEDURE — 87651 STREP A DNA AMP PROBE: CPT

## 2024-08-21 PROCEDURE — 87635 SARS-COV-2 COVID-19 AMP PRB: CPT

## 2024-08-21 PROCEDURE — 99283 EMERGENCY DEPT VISIT LOW MDM: CPT

## 2024-08-21 PROCEDURE — 87502 INFLUENZA DNA AMP PROBE: CPT

## 2024-08-21 ASSESSMENT — ENCOUNTER SYMPTOMS
SHORTNESS OF BREATH: 0
VOMITING: 0
EYES NEGATIVE: 1
SINUS PRESSURE: 0
DIARRHEA: 1
NAUSEA: 1
COUGH: 1
ABDOMINAL PAIN: 1
RHINORRHEA: 0
SORE THROAT: 1

## 2024-08-21 ASSESSMENT — PAIN SCALES - GENERAL
PAINLEVEL_OUTOF10: 2
PAINLEVEL_OUTOF10: 0

## 2024-08-21 ASSESSMENT — PAIN DESCRIPTION - LOCATION: LOCATION: ABDOMEN

## 2024-08-21 ASSESSMENT — PAIN DESCRIPTION - ORIENTATION: ORIENTATION: MID

## 2024-08-21 ASSESSMENT — LIFESTYLE VARIABLES
HOW MANY STANDARD DRINKS CONTAINING ALCOHOL DO YOU HAVE ON A TYPICAL DAY: 5 OR 6
HOW OFTEN DO YOU HAVE A DRINK CONTAINING ALCOHOL: MONTHLY OR LESS

## 2024-08-21 ASSESSMENT — PAIN DESCRIPTION - FREQUENCY: FREQUENCY: INTERMITTENT

## 2024-08-21 ASSESSMENT — PAIN DESCRIPTION - ONSET: ONSET: GRADUAL

## 2024-08-21 ASSESSMENT — PAIN - FUNCTIONAL ASSESSMENT
PAIN_FUNCTIONAL_ASSESSMENT: NONE - DENIES PAIN
PAIN_FUNCTIONAL_ASSESSMENT: PREVENTS OR INTERFERES SOME ACTIVE ACTIVITIES AND ADLS
PAIN_FUNCTIONAL_ASSESSMENT: 0-10

## 2024-08-21 ASSESSMENT — PAIN DESCRIPTION - DESCRIPTORS: DESCRIPTORS: ACHING

## 2024-08-21 NOTE — ED PROVIDER NOTES
[facility] ED  EMERGENCY DEPARTMENT ENCOUNTER      Pt Name: Suresh Bowman  MRN: [id number]  Birthdate 2000  Date of evaluation: 8/21/2024  Provider: KHANH Bowman CNP      HISTORY OF PRESENT ILLNESS    Suresh Bowman is a 23 y.o. male who presents to the Emergency Department with onset of sore throat 5 out of 10 pain constant.  Fevers, nausea, and cough starting 3 days ago.  Today he is having abdominal cramping with diarrhea.  Patient denies shortness of breath or vomiting.  Denies ear pain        REVIEW OF SYSTEMS       Review of Systems   Constitutional:  Positive for chills.   HENT:  Positive for sore throat. Negative for congestion, dental problem, ear discharge, ear pain, hearing loss, rhinorrhea, sinus pressure and sneezing.    Eyes: Negative.    Respiratory:  Positive for cough. Negative for shortness of breath.    Cardiovascular: Negative.  Negative for chest pain.   Gastrointestinal:  Positive for abdominal pain, diarrhea and nausea. Negative for vomiting.   Skin: Negative.    Neurological:  Negative for dizziness and light-headedness.   Hematological:  Negative for adenopathy.   Psychiatric/Behavioral: Negative.           PAST MEDICAL HISTORY     Past Medical History:   Diagnosis Date    Anxiety     Depression     Immune deficiency disorder (HCC)     Willebrand-Juergens disease (HCC)          SURGICAL HISTORY     History reviewed. No pertinent surgical history.      CURRENT MEDICATIONS       Previous Medications    AMPHETAMINE-DEXTROAMPHETAMINE (ADDERALL) 20 MG TABLET    Take 20 mg by mouth daily .    BUTALBITAL-ASPIRIN-CAFFEINE (FIORINAL) -40 MG CAPSULE    Take 1 capsule by mouth every 4 hours as needed for Headaches for up to 3 days..    DICYCLOMINE (BENTYL) 10 MG CAPSULE    Take 1 capsule by mouth every 6 hours as needed (cramps)    FAMOTIDINE (PEPCID) 20 MG TABLET    Take 1 tablet by mouth 2 times daily    KETOROLAC (TORADOL) 10 MG TABLET    Take 1 tablet by mouth every 6

## 2024-08-21 NOTE — DISCHARGE INSTRUCTIONS
Continue to treat symptoms symptomatically, viral syndromes may take up to 1 week to completely resolve.  Below you will find recommended treatments that are available over-the-counter for symptoms you may encounter.    Use Tylenol or ibuprofen to treat pain or fever, any fever greater than 101F should be treated.      Afrin 12-hour nasal spray (oxymetazoline ) for sinus congestion, If you do not have contraindications such as hypertension or any heart conditions, Sudafed (pseudoephedrine) can be used    Tussin cough syrup (Dextromethorphan) if needed to treat symptom of irritating cough.    Mucinex (guaifenesin)  is indicated if you have chest mucus that you are having difficulty coughing up.   Do not use if you have excessive amounts of loose mucus    It is important to REST and increase water intake.  Watch for signs of worsening illness such as feeling light headed or shortness of breath when walking. Return to normal activities when, for at least 24 hours, symptoms are improving overall, and if a fever was present, it has been gone without use of a fever-reducing medication.     
I, Daquan Sheikh, performed the initial face to face bedside interview with this patient regarding history of present illness, review of symptoms and relevant past medical, social and family history.  I completed an independent physical examination.  I was the initial provider who evaluated this patient. I have signed out the follow up of any pending tests (i.e. labs, radiological studies) to the ACP.  I have communicated the patient’s plan of care and disposition with the ACP.  The history, relevant review of systems, past medical and surgical history, medical decision making, and physical examination was documented by the scribe in my presence and I attest to the accuracy of the documentation.

## 2024-09-29 ENCOUNTER — HOSPITAL ENCOUNTER (EMERGENCY)
Age: 24
Discharge: HOME | End: 2024-09-29
Attending: EMERGENCY MEDICINE

## 2024-09-29 ENCOUNTER — APPOINTMENT (OUTPATIENT)
Dept: CT IMAGING | Age: 24
End: 2024-09-29

## 2024-09-29 VITALS
DIASTOLIC BLOOD PRESSURE: 74 MMHG | OXYGEN SATURATION: 99 % | WEIGHT: 200 LBS | RESPIRATION RATE: 16 BRPM | BODY MASS INDEX: 25.67 KG/M2 | TEMPERATURE: 98.3 F | HEART RATE: 94 BPM | HEIGHT: 74 IN | SYSTOLIC BLOOD PRESSURE: 128 MMHG

## 2024-09-29 DIAGNOSIS — G51.0 BELL'S PALSY: Primary | ICD-10-CM

## 2024-09-29 PROCEDURE — 70450 CT HEAD/BRAIN W/O DYE: CPT

## 2024-09-29 PROCEDURE — 6370000000 HC RX 637 (ALT 250 FOR IP): Performed by: EMERGENCY MEDICINE

## 2024-09-29 PROCEDURE — 99284 EMERGENCY DEPT VISIT MOD MDM: CPT

## 2024-09-29 RX ORDER — PREDNISONE 20 MG/1
60 TABLET ORAL DAILY
Qty: 30 TABLET | Refills: 0 | Status: SHIPPED | OUTPATIENT
Start: 2024-09-29 | End: 2024-10-09

## 2024-09-29 RX ORDER — CARBOXYMETHYLCELLULOSE SODIUM 10 MG/ML
1 GEL OPHTHALMIC
Status: COMPLETED | OUTPATIENT
Start: 2024-09-29 | End: 2024-09-29

## 2024-09-29 RX ORDER — PREDNISONE 20 MG/1
60 TABLET ORAL ONCE
Status: COMPLETED | OUTPATIENT
Start: 2024-09-29 | End: 2024-09-29

## 2024-09-29 RX ADMIN — CARBOXYMETHYLCELLULOSE SODIUM 1 DROP: 10 GEL OPHTHALMIC at 01:38

## 2024-09-29 RX ADMIN — PREDNISONE 60 MG: 20 TABLET ORAL at 01:38

## 2024-09-29 ASSESSMENT — LIFESTYLE VARIABLES
HOW MANY STANDARD DRINKS CONTAINING ALCOHOL DO YOU HAVE ON A TYPICAL DAY: 3 OR 4
HOW OFTEN DO YOU HAVE A DRINK CONTAINING ALCOHOL: 2-4 TIMES A MONTH

## 2024-09-29 ASSESSMENT — PAIN - FUNCTIONAL ASSESSMENT
PAIN_FUNCTIONAL_ASSESSMENT: NONE - DENIES PAIN
PAIN_FUNCTIONAL_ASSESSMENT: NONE - DENIES PAIN

## 2024-09-29 NOTE — ED PROVIDER NOTES
drift  Motor Leg, Left (6a): No drift  Motor Leg, Right (6b): No drift  Limb Ataxia (7): Absent  Sensory (8): Normal  Best Language (9): No aphasia  Dysarthria (10): Normal  Extinction and Inattention (11): No abnormality  Total: 1     [place] Coma Scale  Eye Opening: Spontaneous  Best Verbal Response: Oriented  Best Motor Response: Obeys commands  [place] Coma Scale Score: 15                     CIWA Assessment  BP: 128/74  Pulse: 94                 PHYSICAL EXAM    (up to 7 for level 4, 8 or more for level 5)     ED Triage Vitals [09/29/24 0012]   BP Systolic BP Percentile Diastolic BP Percentile Temp Temp Source Pulse Respirations SpO2   (!) 144/87 -- -- 98.3 °F (36.8 °C) Oral (!) 120 16 98 %      Height Weight - Scale         1.88 m (6' 2\") 90.7 kg (200 lb)             Physical Exam  Vitals and nursing note reviewed.   Constitutional:       Appearance: Normal appearance.   HENT:      Head: Normocephalic and atraumatic.        Comments: Left lip and lower face droop     Right Ear: Tympanic membrane normal.      Left Ear: Tympanic membrane normal.      Nose: Nose normal.      Mouth/Throat:      Mouth: Mucous membranes are moist.      Pharynx: Oropharynx is clear.   Eyes:      General: Lids are normal.      Extraocular Movements: Extraocular movements intact.      Conjunctiva/sclera: Conjunctivae normal.      Pupils: Pupils are equal, round, and reactive to light.   Cardiovascular:      Rate and Rhythm: Regular rhythm. Tachycardia present.      Pulses: Normal pulses.      Heart sounds: Normal heart sounds.   Pulmonary:      Effort: Pulmonary effort is normal.      Breath sounds: Normal breath sounds.   Abdominal:      General: Abdomen is flat. Bowel sounds are normal.      Palpations: Abdomen is soft.   Musculoskeletal:         General: Normal range of motion.      Cervical back: Full passive range of motion without pain, normal range of motion and neck supple.   Skin:     General: Skin is warm.      Capillary

## 2025-06-02 ENCOUNTER — HOSPITAL ENCOUNTER (EMERGENCY)
Age: 25
Discharge: HOME | End: 2025-06-02
Attending: EMERGENCY MEDICINE

## 2025-06-02 ENCOUNTER — APPOINTMENT (OUTPATIENT)
Dept: CT IMAGING | Age: 25
End: 2025-06-02

## 2025-06-02 VITALS
OXYGEN SATURATION: 96 % | SYSTOLIC BLOOD PRESSURE: 146 MMHG | TEMPERATURE: 97.9 F | WEIGHT: 215 LBS | BODY MASS INDEX: 26.73 KG/M2 | DIASTOLIC BLOOD PRESSURE: 103 MMHG | HEART RATE: 88 BPM | RESPIRATION RATE: 16 BRPM | HEIGHT: 75 IN

## 2025-06-02 DIAGNOSIS — G44.209 TENSION HEADACHE: Primary | ICD-10-CM

## 2025-06-02 LAB
ALBUMIN SERPL-MCNC: 5 G/DL (ref 3.5–4.6)
ALP SERPL-CCNC: 118 U/L (ref 35–104)
ALT SERPL-CCNC: 88 U/L (ref 0–41)
ANION GAP SERPL CALCULATED.3IONS-SCNC: 15 MEQ/L (ref 9–15)
AST SERPL-CCNC: 50 U/L (ref 0–40)
BASOPHILS # BLD: 0 K/UL (ref 0–0.1)
BASOPHILS NFR BLD: 0.3 % (ref 0.2–1.2)
BILIRUB SERPL-MCNC: 0.5 MG/DL (ref 0.2–0.7)
BUN SERPL-MCNC: 13 MG/DL (ref 6–20)
CALCIUM SERPL-MCNC: 10.3 MG/DL (ref 8.5–9.9)
CHLORIDE SERPL-SCNC: 99 MEQ/L (ref 95–107)
CO2 SERPL-SCNC: 24 MEQ/L (ref 20–31)
CREAT SERPL-MCNC: 1 MG/DL (ref 0.7–1.2)
EOSINOPHIL # BLD: 0.1 K/UL (ref 0–0.5)
EOSINOPHIL NFR BLD: 0.7 % (ref 0.8–7)
ERYTHROCYTE [DISTWIDTH] IN BLOOD BY AUTOMATED COUNT: 12.7 % (ref 11.6–14.4)
GLOBULIN SER CALC-MCNC: 2.6 G/DL (ref 2.3–3.5)
GLUCOSE SERPL-MCNC: 91 MG/DL (ref 70–99)
HCT VFR BLD AUTO: 45 % (ref 42–52)
HGB BLD-MCNC: 15.2 G/DL (ref 13.7–17.5)
IMM GRANULOCYTES # BLD: 0 K/UL
IMM GRANULOCYTES NFR BLD: 0.4 %
LYMPHOCYTES # BLD: 1.4 K/UL (ref 1.3–3.6)
LYMPHOCYTES NFR BLD: 18.8 %
MCH RBC QN AUTO: 30.1 PG (ref 25.7–32.2)
MCHC RBC AUTO-ENTMCNC: 33.8 % (ref 32.3–36.5)
MCV RBC AUTO: 89.1 FL (ref 79–92.2)
MONOCYTES # BLD: 0.7 K/UL (ref 0.3–0.8)
MONOCYTES NFR BLD: 8.9 % (ref 5.3–12.2)
NEUTROPHILS # BLD: 5.3 K/UL (ref 1.8–5.4)
NEUTS SEG NFR BLD: 70.9 % (ref 34–67.9)
PLATELET # BLD AUTO: 288 K/UL (ref 163–337)
POTASSIUM SERPL-SCNC: 4.8 MEQ/L (ref 3.4–4.9)
PROT SERPL-MCNC: 7.6 G/DL (ref 6.3–8)
RBC # BLD AUTO: 5.05 M/UL (ref 4.63–6.08)
SODIUM SERPL-SCNC: 138 MEQ/L (ref 135–144)
WBC # BLD AUTO: 7.4 K/UL (ref 4.2–9)

## 2025-06-02 PROCEDURE — 70496 CT ANGIOGRAPHY HEAD: CPT

## 2025-06-02 PROCEDURE — 96374 THER/PROPH/DIAG INJ IV PUSH: CPT

## 2025-06-02 PROCEDURE — 85025 COMPLETE CBC W/AUTO DIFF WBC: CPT

## 2025-06-02 PROCEDURE — 80053 COMPREHEN METABOLIC PANEL: CPT

## 2025-06-02 PROCEDURE — 6360000002 HC RX W HCPCS: Performed by: EMERGENCY MEDICINE

## 2025-06-02 PROCEDURE — 99285 EMERGENCY DEPT VISIT HI MDM: CPT

## 2025-06-02 PROCEDURE — 6360000004 HC RX CONTRAST MEDICATION: Performed by: EMERGENCY MEDICINE

## 2025-06-02 PROCEDURE — 6370000000 HC RX 637 (ALT 250 FOR IP): Performed by: EMERGENCY MEDICINE

## 2025-06-02 PROCEDURE — 96375 TX/PRO/DX INJ NEW DRUG ADDON: CPT

## 2025-06-02 PROCEDURE — 2580000003 HC RX 258: Performed by: EMERGENCY MEDICINE

## 2025-06-02 PROCEDURE — 36415 COLL VENOUS BLD VENIPUNCTURE: CPT

## 2025-06-02 PROCEDURE — 96361 HYDRATE IV INFUSION ADD-ON: CPT

## 2025-06-02 RX ORDER — DIPHENHYDRAMINE HYDROCHLORIDE 50 MG/ML
25 INJECTION, SOLUTION INTRAMUSCULAR; INTRAVENOUS ONCE
Status: COMPLETED | OUTPATIENT
Start: 2025-06-02 | End: 2025-06-02

## 2025-06-02 RX ORDER — ACETAMINOPHEN 500 MG
1000 TABLET ORAL ONCE
Status: COMPLETED | OUTPATIENT
Start: 2025-06-02 | End: 2025-06-02

## 2025-06-02 RX ORDER — IOPAMIDOL 755 MG/ML
75 INJECTION, SOLUTION INTRAVASCULAR
Status: COMPLETED | OUTPATIENT
Start: 2025-06-02 | End: 2025-06-02

## 2025-06-02 RX ORDER — METOCLOPRAMIDE HYDROCHLORIDE 5 MG/ML
10 INJECTION INTRAMUSCULAR; INTRAVENOUS ONCE
Status: COMPLETED | OUTPATIENT
Start: 2025-06-02 | End: 2025-06-02

## 2025-06-02 RX ORDER — 0.9 % SODIUM CHLORIDE 0.9 %
1000 INTRAVENOUS SOLUTION INTRAVENOUS ONCE
Status: COMPLETED | OUTPATIENT
Start: 2025-06-02 | End: 2025-06-02

## 2025-06-02 RX ADMIN — DIPHENHYDRAMINE HYDROCHLORIDE 25 MG: 50 INJECTION INTRAMUSCULAR; INTRAVENOUS at 19:40

## 2025-06-02 RX ADMIN — METOCLOPRAMIDE 10 MG: 5 INJECTION, SOLUTION INTRAMUSCULAR; INTRAVENOUS at 19:40

## 2025-06-02 RX ADMIN — SODIUM CHLORIDE 1000 ML: 9 INJECTION, SOLUTION INTRAVENOUS at 19:40

## 2025-06-02 RX ADMIN — ACETAMINOPHEN 1000 MG: 500 TABLET ORAL at 19:40

## 2025-06-02 RX ADMIN — IOPAMIDOL 75 ML: 755 INJECTION, SOLUTION INTRAVENOUS at 20:10

## 2025-06-02 ASSESSMENT — ENCOUNTER SYMPTOMS
VOMITING: 0
WHEEZING: 0
PHOTOPHOBIA: 0
SORE THROAT: 0
SHORTNESS OF BREATH: 0
EYE DISCHARGE: 0
ABDOMINAL DISTENTION: 0
CHEST TIGHTNESS: 0
COUGH: 0
ABDOMINAL PAIN: 0

## 2025-06-02 ASSESSMENT — PAIN SCALES - GENERAL: PAINLEVEL_OUTOF10: 6

## 2025-06-02 ASSESSMENT — LIFESTYLE VARIABLES
HOW OFTEN DO YOU HAVE A DRINK CONTAINING ALCOHOL: 2-3 TIMES A WEEK
HOW MANY STANDARD DRINKS CONTAINING ALCOHOL DO YOU HAVE ON A TYPICAL DAY: 3 OR 4

## 2025-06-02 ASSESSMENT — PAIN - FUNCTIONAL ASSESSMENT: PAIN_FUNCTIONAL_ASSESSMENT: 0-10

## 2025-06-02 ASSESSMENT — PAIN DESCRIPTION - LOCATION: LOCATION: HEAD

## 2025-06-02 NOTE — ED PROVIDER NOTES
[place] Coma Scale  Eye Opening: Spontaneous  Best Verbal Response: Oriented  Best Motor Response: Obeys commands  [place] Coma Scale Score: 15 @FLOW([id number])@      PHYSICAL EXAM    (up to 7 for level 4, 8 or more for level 5)     ED Triage Vitals [06/02/25 1917]   BP Systolic BP Percentile Diastolic BP Percentile Temp Temp src Pulse Respirations SpO2   (!) 146/103 -- -- 97.9 °F (36.6 °C) -- 88 16 96 %      Height Weight - Scale         1.9 m (6' 2.8\") 97.5 kg (215 lb)             Physical Exam  Vitals and nursing note reviewed.   Constitutional:       Appearance: He is well-developed.   HENT:      Head: Normocephalic.      Nose: Nose normal.      Mouth/Throat:      Mouth: Mucous membranes are moist.   Eyes:      Conjunctiva/sclera: Conjunctivae normal.      Pupils: Pupils are equal, round, and reactive to light.   Cardiovascular:      Rate and Rhythm: Normal rate and regular rhythm.      Heart sounds: Normal heart sounds.   Pulmonary:      Effort: Pulmonary effort is normal.      Breath sounds: Normal breath sounds.   Abdominal:      General: Bowel sounds are normal.      Palpations: Abdomen is soft.      Tenderness: There is no abdominal tenderness. There is no guarding.   Musculoskeletal:         General: Normal range of motion.      Cervical back: Normal range of motion and neck supple.   Skin:     General: Skin is warm and dry.      Capillary Refill: Capillary refill takes less than 2 seconds.   Neurological:      General: No focal deficit present.      Mental Status: He is alert and oriented to person, place, and time.   Psychiatric:         Mood and Affect: Mood normal.         DIAGNOSTIC RESULTS     EKG: All EKG's are interpreted by the Emergency Department Physician who either signs or Co-signsthis chart in the absence of a cardiologist.      RADIOLOGY:   Non-plain filmimages such as CT, Ultrasound and MRI are read by the radiologist. Plain radiographic images are visualized and preliminarily interpreted

## 2025-07-01 ENCOUNTER — HOSPITAL ENCOUNTER (EMERGENCY)
Age: 25
Discharge: ANOTHER HOSPITAL | End: 2025-07-01
Attending: EMERGENCY MEDICINE
Payer: MEDICAID

## 2025-07-01 ENCOUNTER — HOSPITAL ENCOUNTER (INPATIENT)
Age: 25
LOS: 6 days | Discharge: HOME | DRG: 885 | End: 2025-07-07
Attending: EMERGENCY MEDICINE | Admitting: PSYCHIATRY & NEUROLOGY
Payer: COMMERCIAL

## 2025-07-01 VITALS
OXYGEN SATURATION: 98 % | RESPIRATION RATE: 16 BRPM | SYSTOLIC BLOOD PRESSURE: 130 MMHG | HEIGHT: 74 IN | BODY MASS INDEX: 27.59 KG/M2 | TEMPERATURE: 98.4 F | HEART RATE: 75 BPM | DIASTOLIC BLOOD PRESSURE: 86 MMHG | WEIGHT: 215 LBS

## 2025-07-01 DIAGNOSIS — F32.A DEPRESSION, UNSPECIFIED DEPRESSION TYPE: Primary | ICD-10-CM

## 2025-07-01 DIAGNOSIS — R45.851 SUICIDAL IDEATION: Primary | ICD-10-CM

## 2025-07-01 LAB
ALBUMIN SERPL-MCNC: 4.9 G/DL (ref 3.5–4.6)
ALP SERPL-CCNC: 111 U/L (ref 35–104)
ALT SERPL-CCNC: 90 U/L (ref 0–41)
AMPHET UR QL SCN: NEGATIVE
ANION GAP SERPL CALCULATED.3IONS-SCNC: 19 MEQ/L (ref 9–15)
APAP SERPL-MCNC: <5 UG/ML (ref 10–30)
AST SERPL-CCNC: 47 U/L (ref 0–40)
BACTERIA URNS QL MICRO: NEGATIVE /HPF
BARBITURATES UR QL SCN: NEGATIVE
BASOPHILS # BLD: 0 K/UL (ref 0–0.1)
BASOPHILS NFR BLD: 0.5 % (ref 0.2–1.2)
BENZODIAZ UR QL SCN: NEGATIVE
BILIRUB SERPL-MCNC: 0.7 MG/DL (ref 0.2–0.7)
BILIRUB UR QL STRIP: ABNORMAL
BUN SERPL-MCNC: 10 MG/DL (ref 6–20)
CALCIUM SERPL-MCNC: 9.5 MG/DL (ref 8.5–9.9)
CANNABINOIDS UR QL SCN: NEGATIVE
CHLORIDE SERPL-SCNC: 101 MEQ/L (ref 95–107)
CHOLEST SERPL-MCNC: 211 MG/DL (ref 0–199)
CK SERPL-CCNC: 153 U/L (ref 0–190)
CLARITY UR: CLEAR
CO2 SERPL-SCNC: 20 MEQ/L (ref 20–31)
COCAINE UR QL SCN: NEGATIVE
COLOR UR: YELLOW
CREAT SERPL-MCNC: 1 MG/DL (ref 0.7–1.2)
DRUG SCREEN COMMENT UR-IMP: NORMAL
EKG ATRIAL RATE: 98 BPM
EKG DIAGNOSIS: NORMAL
EKG P AXIS: 57 DEGREES
EKG P-R INTERVAL: 170 MS
EKG Q-T INTERVAL: 356 MS
EKG QRS DURATION: 94 MS
EKG QTC CALCULATION (BAZETT): 454 MS
EKG R AXIS: 85 DEGREES
EKG T AXIS: 76 DEGREES
EKG VENTRICULAR RATE: 98 BPM
EOSINOPHIL # BLD: 0 K/UL (ref 0–0.5)
EOSINOPHIL NFR BLD: 0.4 % (ref 0.8–7)
EPI CELLS #/AREA URNS HPF: NORMAL /HPF
ERYTHROCYTE [DISTWIDTH] IN BLOOD BY AUTOMATED COUNT: 12.3 % (ref 11.6–14.4)
ETHANOL PERCENT: NORMAL G/DL
ETHANOLAMINE SERPL-MCNC: <10 MG/DL (ref 0–0.08)
FENTANYL SCREEN, URINE: NEGATIVE
GLOBULIN SER CALC-MCNC: 2.6 G/DL (ref 2.3–3.5)
GLUCOSE SERPL-MCNC: 93 MG/DL (ref 70–99)
GLUCOSE UR STRIP-MCNC: NEGATIVE MG/DL
HCT VFR BLD AUTO: 43.4 % (ref 42–52)
HDLC SERPL-MCNC: 40 MG/DL (ref 40–59)
HGB BLD-MCNC: 14.7 G/DL (ref 13.7–17.5)
HGB UR QL STRIP: ABNORMAL
HYALINE CASTS #/AREA URNS LPF: NORMAL /LPF (ref 0–5)
IMM GRANULOCYTES # BLD: 0 K/UL
IMM GRANULOCYTES NFR BLD: 0.4 %
KETONES UR STRIP-MCNC: ABNORMAL MG/DL
LDLC SERPL CALC-MCNC: 134 MG/DL (ref 0–129)
LEUKOCYTE ESTERASE UR QL STRIP: NEGATIVE
LYMPHOCYTES # BLD: 1.5 K/UL (ref 1.3–3.6)
LYMPHOCYTES NFR BLD: 19.5 %
MCH RBC QN AUTO: 30 PG (ref 25.7–32.2)
MCHC RBC AUTO-ENTMCNC: 33.9 % (ref 32.3–36.5)
MCV RBC AUTO: 88.6 FL (ref 79–92.2)
METHADONE UR QL SCN: NEGATIVE
MONOCYTES # BLD: 0.6 K/UL (ref 0.3–0.8)
MONOCYTES NFR BLD: 7.4 % (ref 5.3–12.2)
MUCOUS THREADS URNS QL MICRO: PRESENT /LPF
NEUTROPHILS # BLD: 5.4 K/UL (ref 1.8–5.4)
NEUTS SEG NFR BLD: 71.8 % (ref 34–67.9)
NITRITE UR QL STRIP: NEGATIVE
OPIATES UR QL SCN: NEGATIVE
OXYCODONE UR QL SCN: NEGATIVE
PCP UR QL SCN: NEGATIVE
PH UR STRIP: 5.5 [PH] (ref 5–9)
PLATELET # BLD AUTO: 289 K/UL (ref 163–337)
POTASSIUM SERPL-SCNC: 3.7 MEQ/L (ref 3.4–4.9)
PROT SERPL-MCNC: 7.5 G/DL (ref 6.3–8)
PROT UR STRIP-MCNC: 100 MG/DL
RBC # BLD AUTO: 4.9 M/UL (ref 4.63–6.08)
RBC #/AREA URNS HPF: NORMAL /HPF (ref 0–2)
SALICYLATES SERPL-MCNC: <0.5 MG/DL (ref 15–30)
SARS-COV-2 RDRP RESP QL NAA+PROBE: NOT DETECTED
SODIUM SERPL-SCNC: 140 MEQ/L (ref 135–144)
SP GR UR STRIP: >=1.03 (ref 1–1.03)
TRIGL SERPL-MCNC: 187 MG/DL (ref 0–150)
TSH SERPL-MCNC: 2.82 UIU/ML (ref 0.44–3.86)
URINE REFLEX TO CULTURE: ABNORMAL
UROBILINOGEN UR STRIP-ACNC: 1 E.U./DL
WBC # BLD AUTO: 7.5 K/UL (ref 4.2–9)
WBC #/AREA URNS HPF: NORMAL /HPF (ref 0–5)

## 2025-07-01 PROCEDURE — 82550 ASSAY OF CK (CPK): CPT

## 2025-07-01 PROCEDURE — 99285 EMERGENCY DEPT VISIT HI MDM: CPT

## 2025-07-01 PROCEDURE — 84443 ASSAY THYROID STIM HORMONE: CPT

## 2025-07-01 PROCEDURE — 85025 COMPLETE CBC W/AUTO DIFF WBC: CPT

## 2025-07-01 PROCEDURE — 82077 ASSAY SPEC XCP UR&BREATH IA: CPT

## 2025-07-01 PROCEDURE — 6370000000 HC RX 637 (ALT 250 FOR IP): Performed by: EMERGENCY MEDICINE

## 2025-07-01 PROCEDURE — 1240000000 HC EMOTIONAL WELLNESS R&B

## 2025-07-01 PROCEDURE — 81001 URINALYSIS AUTO W/SCOPE: CPT

## 2025-07-01 PROCEDURE — 87635 SARS-COV-2 COVID-19 AMP PRB: CPT

## 2025-07-01 PROCEDURE — 80307 DRUG TEST PRSMV CHEM ANLYZR: CPT

## 2025-07-01 PROCEDURE — 90791 PSYCH DIAGNOSTIC EVALUATION: CPT | Performed by: SOCIAL WORKER

## 2025-07-01 PROCEDURE — 36415 COLL VENOUS BLD VENIPUNCTURE: CPT

## 2025-07-01 PROCEDURE — 80143 DRUG ASSAY ACETAMINOPHEN: CPT

## 2025-07-01 PROCEDURE — 80053 COMPREHEN METABOLIC PANEL: CPT

## 2025-07-01 PROCEDURE — 80179 DRUG ASSAY SALICYLATE: CPT

## 2025-07-01 PROCEDURE — 93005 ELECTROCARDIOGRAM TRACING: CPT

## 2025-07-01 PROCEDURE — 80061 LIPID PANEL: CPT

## 2025-07-01 PROCEDURE — 6370000000 HC RX 637 (ALT 250 FOR IP): Performed by: PSYCHIATRY & NEUROLOGY

## 2025-07-01 RX ORDER — MULTIVITAMIN WITH IRON
1 TABLET ORAL DAILY
Status: DISCONTINUED | OUTPATIENT
Start: 2025-07-01 | End: 2025-07-07 | Stop reason: HOSPADM

## 2025-07-01 RX ORDER — HYDROXYZINE HYDROCHLORIDE 50 MG/ML
50 INJECTION, SOLUTION INTRAMUSCULAR EVERY 6 HOURS PRN
Status: DISCONTINUED | OUTPATIENT
Start: 2025-07-01 | End: 2025-07-07 | Stop reason: HOSPADM

## 2025-07-01 RX ORDER — LORAZEPAM 2 MG/1
2 TABLET ORAL ONCE
Status: COMPLETED | OUTPATIENT
Start: 2025-07-01 | End: 2025-07-01

## 2025-07-01 RX ORDER — LORAZEPAM 0.5 MG/1
0.5 TABLET ORAL EVERY 4 HOURS PRN
Status: DISCONTINUED | OUTPATIENT
Start: 2025-07-01 | End: 2025-07-07 | Stop reason: HOSPADM

## 2025-07-01 RX ORDER — ACETAMINOPHEN 325 MG/1
650 TABLET ORAL EVERY 4 HOURS PRN
Status: DISCONTINUED | OUTPATIENT
Start: 2025-07-01 | End: 2025-07-07 | Stop reason: HOSPADM

## 2025-07-01 RX ORDER — HYDROXYZINE PAMOATE 50 MG/1
50 CAPSULE ORAL EVERY 6 HOURS PRN
Status: DISCONTINUED | OUTPATIENT
Start: 2025-07-01 | End: 2025-07-07 | Stop reason: HOSPADM

## 2025-07-01 RX ORDER — LORAZEPAM 2 MG/1
2 TABLET ORAL
Status: DISCONTINUED | OUTPATIENT
Start: 2025-07-01 | End: 2025-07-07 | Stop reason: HOSPADM

## 2025-07-01 RX ORDER — BENZTROPINE MESYLATE 1 MG/ML
2 INJECTION, SOLUTION INTRAMUSCULAR; INTRAVENOUS 2 TIMES DAILY PRN
Status: DISCONTINUED | OUTPATIENT
Start: 2025-07-01 | End: 2025-07-07 | Stop reason: HOSPADM

## 2025-07-01 RX ORDER — LORAZEPAM 2 MG/ML
4 INJECTION INTRAMUSCULAR
Status: DISCONTINUED | OUTPATIENT
Start: 2025-07-01 | End: 2025-07-07 | Stop reason: HOSPADM

## 2025-07-01 RX ORDER — HALOPERIDOL 5 MG/ML
5 INJECTION INTRAMUSCULAR EVERY 6 HOURS PRN
Status: DISCONTINUED | OUTPATIENT
Start: 2025-07-01 | End: 2025-07-07 | Stop reason: HOSPADM

## 2025-07-01 RX ORDER — LORAZEPAM 1 MG/1
1 TABLET ORAL EVERY 4 HOURS PRN
Status: DISCONTINUED | OUTPATIENT
Start: 2025-07-01 | End: 2025-07-07 | Stop reason: HOSPADM

## 2025-07-01 RX ORDER — NICOTINE 21 MG/24HR
1 PATCH, TRANSDERMAL 24 HOURS TRANSDERMAL ONCE
Status: DISCONTINUED | OUTPATIENT
Start: 2025-07-01 | End: 2025-07-01

## 2025-07-01 RX ORDER — LANOLIN ALCOHOL/MO/W.PET/CERES
100 CREAM (GRAM) TOPICAL DAILY
Status: DISCONTINUED | OUTPATIENT
Start: 2025-07-01 | End: 2025-07-07 | Stop reason: HOSPADM

## 2025-07-01 RX ORDER — FOLIC ACID 1 MG/1
1 TABLET ORAL DAILY
Status: DISCONTINUED | OUTPATIENT
Start: 2025-07-01 | End: 2025-07-07 | Stop reason: HOSPADM

## 2025-07-01 RX ORDER — TRAZODONE HYDROCHLORIDE 50 MG/1
50 TABLET ORAL NIGHTLY PRN
Status: DISCONTINUED | OUTPATIENT
Start: 2025-07-01 | End: 2025-07-07 | Stop reason: HOSPADM

## 2025-07-01 RX ORDER — HALOPERIDOL 5 MG/1
5 TABLET ORAL EVERY 6 HOURS PRN
Status: DISCONTINUED | OUTPATIENT
Start: 2025-07-01 | End: 2025-07-07 | Stop reason: HOSPADM

## 2025-07-01 RX ORDER — MAGNESIUM HYDROXIDE/ALUMINUM HYDROXICE/SIMETHICONE 120; 1200; 1200 MG/30ML; MG/30ML; MG/30ML
30 SUSPENSION ORAL PRN
Status: DISCONTINUED | OUTPATIENT
Start: 2025-07-01 | End: 2025-07-07 | Stop reason: HOSPADM

## 2025-07-01 RX ADMIN — NICOTINE POLACRILEX 4 MG: 4 LOZENGE ORAL at 17:47

## 2025-07-01 RX ADMIN — LORAZEPAM 2 MG: 2 TABLET ORAL at 08:35

## 2025-07-01 RX ADMIN — Medication 100 MG: at 12:11

## 2025-07-01 RX ADMIN — FOLIC ACID 1 MG: 1 TABLET ORAL at 12:11

## 2025-07-01 RX ADMIN — HYDROXYZINE PAMOATE 50 MG: 25 CAPSULE ORAL at 17:48

## 2025-07-01 RX ADMIN — MULTIVITAMIN TABLET 1 TABLET: TABLET at 12:11

## 2025-07-01 ASSESSMENT — PATIENT HEALTH QUESTIONNAIRE - PHQ9
5. POOR APPETITE OR OVEREATING: NEARLY EVERY DAY
6. FEELING BAD ABOUT YOURSELF - OR THAT YOU ARE A FAILURE OR HAVE LET YOURSELF OR YOUR FAMILY DOWN: MORE THAN HALF THE DAYS
10. IF YOU CHECKED OFF ANY PROBLEMS, HOW DIFFICULT HAVE THESE PROBLEMS MADE IT FOR YOU TO DO YOUR WORK, TAKE CARE OF THINGS AT HOME, OR GET ALONG WITH OTHER PEOPLE: VERY DIFFICULT
SUM OF ALL RESPONSES TO PHQ QUESTIONS 1-9: 21
SUM OF ALL RESPONSES TO PHQ QUESTIONS 1-9: 21
9. THOUGHTS THAT YOU WOULD BE BETTER OFF DEAD, OR OF HURTING YOURSELF: MORE THAN HALF THE DAYS
SUM OF ALL RESPONSES TO PHQ QUESTIONS 1-9: 19
3. TROUBLE FALLING OR STAYING ASLEEP: NEARLY EVERY DAY
1. LITTLE INTEREST OR PLEASURE IN DOING THINGS: SEVERAL DAYS
4. FEELING TIRED OR HAVING LITTLE ENERGY: NEARLY EVERY DAY
8. MOVING OR SPEAKING SO SLOWLY THAT OTHER PEOPLE COULD HAVE NOTICED. OR THE OPPOSITE, BEING SO FIGETY OR RESTLESS THAT YOU HAVE BEEN MOVING AROUND A LOT MORE THAN USUAL: MORE THAN HALF THE DAYS
SUM OF ALL RESPONSES TO PHQ QUESTIONS 1-9: 21
2. FEELING DOWN, DEPRESSED OR HOPELESS: MORE THAN HALF THE DAYS
7. TROUBLE CONCENTRATING ON THINGS, SUCH AS READING THE NEWSPAPER OR WATCHING TELEVISION: NEARLY EVERY DAY

## 2025-07-01 ASSESSMENT — SLEEP AND FATIGUE QUESTIONNAIRES
SLEEP PATTERN: INSOMNIA
DO YOU HAVE DIFFICULTY SLEEPING: YES
DO YOU USE A SLEEP AID: COMMENT
AVERAGE NUMBER OF SLEEP HOURS: 5

## 2025-07-01 ASSESSMENT — ENCOUNTER SYMPTOMS
SORE THROAT: 0
ABDOMINAL PAIN: 0
COUGH: 0
SHORTNESS OF BREATH: 0
EYE DISCHARGE: 0
BACK PAIN: 0
NAUSEA: 0
VOMITING: 0
EYE REDNESS: 0

## 2025-07-01 ASSESSMENT — LIFESTYLE VARIABLES
HOW OFTEN DO YOU HAVE A DRINK CONTAINING ALCOHOL: 4 OR MORE TIMES A WEEK
HOW MANY STANDARD DRINKS CONTAINING ALCOHOL DO YOU HAVE ON A TYPICAL DAY: 10 OR MORE
HOW MANY STANDARD DRINKS CONTAINING ALCOHOL DO YOU HAVE ON A TYPICAL DAY: 5 OR 6
HOW OFTEN DO YOU HAVE A DRINK CONTAINING ALCOHOL: 4 OR MORE TIMES A WEEK
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
## 2025-07-01 NOTE — CARE COORDINATION
Defer Leisure Assessment  July 1, 2025 / 1425  pm    Patient was approached regarding leisure assessment. Patient did not respond to name being called/knocking on door. As a result, assessment was deferred.    Signature: Naomi Lopez Licensed Professional Music Therapist (LPMT)

## 2025-07-01 NOTE — ED NOTES
Patient reviewed with Dr. Clarke. Discussed events leading to admit, current assessment, previous history, labs and EKG. Received order to admit to 3W with [facility] protocol. Patient updated on plan of care and verbalizes understanding.

## 2025-07-01 NOTE — ED NOTES
Patient admitted to [facility] bed 2. All belongings secured and patient changed into psych safe clothing per [facility] staff. Report received from [place]. Zone 1 provider notified of admit. Patient reports daily consumption of 16-20 beers for the past 3 years with last drink yesterday morning. Denies any withdrawal symptoms at this time. Per report patient presented to the [facility] ED with increased depression and suicidal thoughts with plan to \"slit his throat.\" All labs and EKG were completed at [facility]. Patient providered oral fluids per request.
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
## 2025-07-01 NOTE — GROUP NOTE
Patient did not attend group.    Date: 7/1/2025    Group Start Time: 1215  Group End Time: 1300  Group Topic: Music Therapy    [facility] 3W Naomi Jean    Sharifa Substitution  Composition/Songwriting, Sharifa Analysis/Discussion, Re-creative Singing    Patients will listen to \"Hand in My Pocket\" by Lia Abebe and discuss lyrics, themes, and/or interpretations derived from the song. Patients will identify positive/negative qualities about themselves to contribute to a group sharifa substitution. Patients will be encouraged to perform the new song and reflect on the experience as a group.     Focus: Building Positive Experiences, Challenging Negative Self-Talk  Goals: Improve/Maintain Identity Development, Increase/Maintain Level of Socialization, Improve Mood, Improve/Maintain Self-Expression, Increase Sensory Stimulation     Signature: Naomi Lopez, Licensed Professional Music Therapist (LPMT)

## 2025-07-01 NOTE — CONSULTS
Consult Note            Date:7/1/2025        Patient Name:Suresh Bowman     YOB: 2000     Age:24 y.o.    Reason for Consult: Evaluation & recommendation for chronic disease manage    Chief Complaint   Suicidal thoughts      History Obtained From   patient, electronic medical record    History of Present Illness   Patient is a 24-year-old male admitted for thoughts of suicide ideation.  Patient has a past medical history of anxiety, depression, Willebrand- Juergens disease.  Patient was medically cleared.  Patient denies chest pain, palpitations, lightheadedness, headache, dizziness, shortness of breath, cough, N/V/D, and changes in appetite.  Patient also denies smoking, illicit drug, and alcohol use.  Denies self-inflicted injuries or wounds.   Hospitalist consulted for evaluation and recommendations for acute and chronic disease management while receiving inpatient psych services.        Past Medical History     Past Medical History:   Diagnosis Date    Anxiety     Depression     Immune deficiency disorder     Willebrand-Juergens disease (HCC)         Past Surgical History   History reviewed. No pertinent surgical history.     Medications     Prior to Admission medications    Not on File        nicotine (NICODERM CQ) 14 MG/24HR 1 patch, Once  hydrOXYzine pamoate (VISTARIL) capsule 50 mg, Q6H PRN   Or  hydrOXYzine (VISTARIL) injection 50 mg, Q6H PRN  multivitamin 1 tablet, Daily  folic acid (FOLVITE) tablet 1 mg, Daily  thiamine tablet 100 mg, Daily  LORazepam (ATIVAN) tablet 0.5 mg, Q4H PRN   Or  LORazepam (ATIVAN) tablet 1 mg, Q4H PRN   Or  LORazepam (ATIVAN) tablet 2 mg, Q2H PRN   Or  LORazepam (ATIVAN) tablet 3 mg, Q1H PRN   Or  LORazepam (ATIVAN) injection 4 mg, Q1H PRN  acetaminophen (TYLENOL) tablet 650 mg, Q4H PRN  magnesium hydroxide (MILK OF MAGNESIA) 400 MG/5ML suspension 30 mL, Daily PRN  aluminum & magnesium hydroxide-simethicone (MAALOX PLUS) 200-200-20 MG/5ML suspension 30 mL,

## 2025-07-01 NOTE — ED NOTES
Physicians at bedside to transport patient to [facility] ER. Electronically signed by Mimi Vazquez RN on 7/1/2025 at 7:18 AM    0720: Report given to  [facility] nurse Etienne. Electronically signed by Mimi Vazquez RN on 7/1/2025 at 7:25 AM
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
## 2025-07-01 NOTE — ED PROVIDER NOTES
[facility] EMERGENCY DEPARTMENT  EMERGENCY DEPARTMENT ENCOUNTER      Pt Name: Suresh Bowman  MRN: [id number]  Birthdate 2000  Date of evaluation: 7/1/2025  Provider: Blaise Huffman MD  9:56 AM    CHIEF COMPLAINT     No chief complaint on file.        HISTORY OF PRESENT ILLNESS    Suresh Bowman is a 24 y.o. male who presents to the emergency department as a transfer from another emergency department for psychiatric admission. Patient tells me that tonight he was thinking about harming himself.  Says in the past he has attempted suicide with overdose.  Denies HI/AVH.  Denies other acute medical complaint at this time.  Does drink daily, denies previous history of withdrawal    HPI  Chart review notes history of anxiety, depression, immune deficiency disorder  Nursing Notes were reviewed.    REVIEW OF SYSTEMS       Review of Systems   Cardiovascular:  Negative for chest pain.   Gastrointestinal:  Negative for abdominal pain.   Psychiatric/Behavioral:  Positive for suicidal ideas.    All other systems reviewed and are negative.      Except as noted above the remainder of the review of systems was reviewed and negative.       PAST MEDICAL HISTORY     Past Medical History:   Diagnosis Date    Anxiety     Depression     Immune deficiency disorder     Willebrand-Juergens disease (HCC)          SURGICAL HISTORY     History reviewed. No pertinent surgical history.      CURRENT MEDICATIONS       Previous Medications    No medications on file       ALLERGIES     Adhesive tape    FAMILY HISTORY     History reviewed. No pertinent family history.       SOCIAL HISTORY       Social History     Socioeconomic History    Marital status: Single     Spouse name: None    Number of children: None    Years of education: None    Highest education level: None   Tobacco Use    Smoking status: Former     Current packs/day: 0.25     Average packs/day: 0.3 packs/day for 5.0 years (1.3 ttl pk-yrs)     Types: E-Cigarettes, Cigarettes

## 2025-07-02 PROCEDURE — 1240000000 HC EMOTIONAL WELLNESS R&B

## 2025-07-02 PROCEDURE — 99223 1ST HOSP IP/OBS HIGH 75: CPT | Performed by: PSYCHIATRY & NEUROLOGY

## 2025-07-02 PROCEDURE — 6370000000 HC RX 637 (ALT 250 FOR IP): Performed by: PSYCHIATRY & NEUROLOGY

## 2025-07-02 RX ORDER — NICOTINE 21 MG/24HR
1 PATCH, TRANSDERMAL 24 HOURS TRANSDERMAL DAILY
Status: DISCONTINUED | OUTPATIENT
Start: 2025-07-02 | End: 2025-07-03

## 2025-07-02 RX ORDER — MECLIZINE HCL 12.5 MG 12.5 MG/1
12.5 TABLET ORAL 3 TIMES DAILY PRN
Status: DISCONTINUED | OUTPATIENT
Start: 2025-07-02 | End: 2025-07-07 | Stop reason: HOSPADM

## 2025-07-02 RX ORDER — LURASIDONE HYDROCHLORIDE 20 MG/1
20 TABLET, FILM COATED ORAL
Status: DISCONTINUED | OUTPATIENT
Start: 2025-07-02 | End: 2025-07-07 | Stop reason: HOSPADM

## 2025-07-02 RX ADMIN — LURASIDONE HYDROCHLORIDE 20 MG: 20 TABLET, FILM COATED ORAL at 16:40

## 2025-07-02 RX ADMIN — Medication 100 MG: at 08:36

## 2025-07-02 RX ADMIN — MULTIVITAMIN TABLET 1 TABLET: TABLET at 08:36

## 2025-07-02 RX ADMIN — FOLIC ACID 1 MG: 1 TABLET ORAL at 08:36

## 2025-07-02 RX ADMIN — HYDROXYZINE PAMOATE 50 MG: 25 CAPSULE ORAL at 18:50

## 2025-07-02 RX ADMIN — NICOTINE POLACRILEX 4 MG: 4 LOZENGE ORAL at 06:26

## 2025-07-02 ASSESSMENT — PATIENT HEALTH QUESTIONNAIRE - PHQ9
SUM OF ALL RESPONSES TO PHQ QUESTIONS 1-9: 2
6. FEELING BAD ABOUT YOURSELF - OR THAT YOU ARE A FAILURE OR HAVE LET YOURSELF OR YOUR FAMILY DOWN: NOT AT ALL
10. IF YOU CHECKED OFF ANY PROBLEMS, HOW DIFFICULT HAVE THESE PROBLEMS MADE IT FOR YOU TO DO YOUR WORK, TAKE CARE OF THINGS AT HOME, OR GET ALONG WITH OTHER PEOPLE: SOMEWHAT DIFFICULT
5. POOR APPETITE OR OVEREATING: NOT AT ALL
3. TROUBLE FALLING OR STAYING ASLEEP: NOT AT ALL
9. THOUGHTS THAT YOU WOULD BE BETTER OFF DEAD, OR OF HURTING YOURSELF: NOT AT ALL
4. FEELING TIRED OR HAVING LITTLE ENERGY: SEVERAL DAYS
SUM OF ALL RESPONSES TO PHQ QUESTIONS 1-9: 2
1. LITTLE INTEREST OR PLEASURE IN DOING THINGS: NOT AT ALL
8. MOVING OR SPEAKING SO SLOWLY THAT OTHER PEOPLE COULD HAVE NOTICED. OR THE OPPOSITE, BEING SO FIGETY OR RESTLESS THAT YOU HAVE BEEN MOVING AROUND A LOT MORE THAN USUAL: NOT AT ALL
SUM OF ALL RESPONSES TO PHQ QUESTIONS 1-9: 2
2. FEELING DOWN, DEPRESSED OR HOPELESS: SEVERAL DAYS
7. TROUBLE CONCENTRATING ON THINGS, SUCH AS READING THE NEWSPAPER OR WATCHING TELEVISION: NOT AT ALL
SUM OF ALL RESPONSES TO PHQ QUESTIONS 1-9: 2

## 2025-07-02 ASSESSMENT — LIFESTYLE VARIABLES
HOW OFTEN DO YOU HAVE A DRINK CONTAINING ALCOHOL: 4 OR MORE TIMES A WEEK
HOW MANY STANDARD DRINKS CONTAINING ALCOHOL DO YOU HAVE ON A TYPICAL DAY: 10 OR MORE

## 2025-07-02 NOTE — CARE COORDINATION
Per note from Edgar ([facility]) \" Suresh wants continued peer support when he returns to the community. He is interest in possibly getting Vivatrol, and would need his medicaid valid, but will look for a provider.\"

## 2025-07-02 NOTE — CARE COORDINATION
7/2/2025 @ 0838 - Patient was approached regarding the completion of the Leisure Assessment. Patient was cooperative and engaging. He has several leisure interests. Those being making music, skating, and cleaning. Patient stated he has an adequate support system including friends and family. Patient denied any emotional difficulties aside from chronic depression. He stated he may want medication for depression and anxiety. Patient remained cooperative and engaged the entire session. Very polite and friendly.    Documentation completed by: Rosa Villanueva MA [facility]

## 2025-07-02 NOTE — GROUP NOTE
Patient did not attend group.    Date: 7/2/2025    Group Start Time: 0925  Group End Time: 1005  Group Topic: Community Meeting    [facility] 3W Naomi Jean    Building Self-Esteem through Music  Sharifa Analysis/Discussion, Receptive Music Listening    Patients will listen to the song \"Love Me More\" by Chip Justin and identify lyrics, themes, & interpretations that they resonate with. Patients will express their thoughts on the songs and how it may relate to their personal lives. Patients will explore their strengths/positive qualities, challenges they've overcome, and their values.    Focus: Challenging Negative Self-Talk, Processing  Goals: Improve/Maintain Identity Development, Improve/Maintain Insight / Self-Awareness, Improve/Maintain Self-Esteem, Improve/Maintain Self-Expression, Promote Reality Orientation     Signature: Naomi Lopez, Licensed Professional Music Therapist (LPMT)

## 2025-07-02 NOTE — GROUP NOTE
Patient did not attend group.    Date: 7/2/2025    Group Start Time: 1215  Group End Time: 1310  Group Topic: Music Therapy    [facility] 3W Naomi Jean    Live Music Group  Live Music Listening, Re-creative Singing    Patients will be given a songbook and offered the opportunity to select (a) song(s) of their choice for live music listening on [employer]. Patients will be encouraged to sing along, move along, and listen to the music.    Focus: Building Positive Experiences, Relaxation   Goals: Increase/Maintain Level of Socialization, Improve Mood, Improve/Maintain Self-Expression, Improve/Maintain Use of Coping Skills, Increase Sensory Stimulation     Signature: Naomi Lopez, Licensed Professional Music Therapist (LPMT)
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
## 2025-07-02 NOTE — H&P
history.    Allergies:   Adhesive tape    Family History    Family Psychiatric History: Grandfather - Schizophrenia, Dad - PTSD Anxiety, Depression Mom- Anxiety Depression         EXAMINATION:    REVIEW OF SYSTEMS:    ROS:  [x] All negative/unchanged except if checked. Explain positive(checked items) below:  [] Constitutional  [] Eyes  [] Ear/Nose/Mouth/Throat  [] Respiratory  [] CV  [] GI  []   [] Musculoskeletal  [] Skin/Breast  [] Neurological  [] Endocrine  [] Heme/Lymph  [] Allergic/Immunologic    Explanation:     Vitals:  /85   Pulse 70   Temp 97.5 °F (36.4 °C) (Oral)   Resp 18   SpO2 100%      Neurologic Exam:   Muscle Strength & Tone: normal  cranial nerves II-XII grossly in tact  Gait: normal gait   Involuntary Movements: No    Mental Status Examination:    Level of consciousness:  within normal limits   Appearance:  ill-appearing  Behavior/Motor:  psychomotor retardation  Attitude toward examiner:  cooperative  Speech:  slow   Mood: decreased range and depressed  Affect:  mood congruent  Thought processes:  goal directed   Association:  Thought content:  Suicidal Ideation:  passive  Delusions:  no evidence of delusions  Perceptual Disturbance:  denies any perceptual disturbance  Cognition:  oriented to person, place, and time   Attention & Concentration intact  Memory intact  Insight fair   Judgement fair   Fund of Knowledge adequate    Mini Mental Status 30/30      DIAGNOSIS:    Bipolar depression  PTSD      RISK ASSESSMENT:    SUICIDE RISK ASSESSMENT: high  HOMICIDE: low  AGITATION/VIOLENCE: low  ELOPEMENT: low    LABS: REVIEWED TODAY:  Recent Labs     07/01/25 0227   WBC 7.5   HGB 14.7        Recent Labs     07/01/25 0227      K 3.7      CO2 20   BUN 10   CREATININE 1.00   GLUCOSE 93     Recent Labs     07/01/25 0227   BILITOT 0.7   ALKPHOS 111*   AST 47*   ALT 90*     Lab Results   Component Value Date/Time    ROSA Negative 07/01/2025 02:39 AM    LABBENZ Negative

## 2025-07-02 NOTE — GROUP NOTE
Group Therapy Note    Date: 7/2/2025    Group Start Time: 1015  Group End Time: 1100  Group Topic: Art Therapy     [facility] 3W oRsa Self, ENRRIQUEW        Group Therapy Note    Attendees: 13       Patient's Goal:  To participate in morning group art therapy.    Notes:  Patient attended the morning group art therapy session. He arrived a bit after the start of group but was able to connect with his peers. Patient worked on a hand coloring task and was social with peers. He seemed to benefit from the intervention.     Status After Intervention:  Unchanged    Participation Level: Active Listener and Interactive    Participation Quality: Appropriate and Attentive      Speech:  normal      Thought Process/Content: Logical      Affective Functioning: Congruent      Mood: elevated      Level of consciousness:  Alert      Response to Learning: Able to verbalize current knowledge/experience      Endings: None Reported    Modes of Intervention: Activity      Discipline Responsible: Psychoeducational Specialist      Signature:  Rosa Villanueva MA ATR LPAT

## 2025-07-03 PROBLEM — F31.9 BIPOLAR DEPRESSION (HCC): Status: ACTIVE | Noted: 2025-07-01

## 2025-07-03 PROCEDURE — 99232 SBSQ HOSP IP/OBS MODERATE 35: CPT | Performed by: PSYCHIATRY & NEUROLOGY

## 2025-07-03 PROCEDURE — 6370000000 HC RX 637 (ALT 250 FOR IP): Performed by: PSYCHIATRY & NEUROLOGY

## 2025-07-03 PROCEDURE — 1240000000 HC EMOTIONAL WELLNESS R&B

## 2025-07-03 RX ADMIN — NICOTINE POLACRILEX 4 MG: 4 LOZENGE ORAL at 20:56

## 2025-07-03 RX ADMIN — Medication 100 MG: at 08:08

## 2025-07-03 RX ADMIN — NICOTINE POLACRILEX 4 MG: 4 LOZENGE ORAL at 12:30

## 2025-07-03 RX ADMIN — NICOTINE POLACRILEX 4 MG: 4 LOZENGE ORAL at 18:47

## 2025-07-03 RX ADMIN — LURASIDONE HYDROCHLORIDE 20 MG: 20 TABLET, FILM COATED ORAL at 16:25

## 2025-07-03 RX ADMIN — NICOTINE POLACRILEX 4 MG: 4 LOZENGE ORAL at 16:25

## 2025-07-03 RX ADMIN — HYDROXYZINE PAMOATE 50 MG: 25 CAPSULE ORAL at 14:01

## 2025-07-03 RX ADMIN — NICOTINE POLACRILEX 4 MG: 4 LOZENGE ORAL at 10:30

## 2025-07-03 RX ADMIN — FOLIC ACID 1 MG: 1 TABLET ORAL at 08:08

## 2025-07-03 RX ADMIN — TRAZODONE HYDROCHLORIDE 50 MG: 50 TABLET ORAL at 21:13

## 2025-07-03 RX ADMIN — MULTIVITAMIN TABLET 1 TABLET: TABLET at 08:08

## 2025-07-03 RX ADMIN — NICOTINE POLACRILEX 4 MG: 4 LOZENGE ORAL at 08:30

## 2025-07-03 RX ADMIN — NICOTINE POLACRILEX 4 MG: 4 LOZENGE ORAL at 14:24

## 2025-07-03 NOTE — GROUP NOTE
Date: 7/3/2025    Group Start Time: 0905  Group End Time: 0936  Group Topic: Psychoeducation    [facility] 3W Naomi Jean    Cognitive Distortions: Automatic Thoughts  Clarifying, Education, Exploration, Support    Patients will learn about common cognitive distortions and describe how they might appear in/impact our everyday lives. Patients will determine potential triggers, identify/reflect on their \"automatic\" thoughts related to potential triggers, and come up with a way to re-frame the \"automatic\" thought individually.     Focus: Cognitive Distortions, Automatic Thoughts   Goals: Improve Self-Awareness/Insight, Improve Coping Skills, Decrease Negative Thoughts/Negative Self-Talk, Improve Self-Esteem     Patient accepted handout from this writer and engaged in discussions. Patient expressed that he tends to jump to conclusions, and that sometimes he is correct in his assumptions which \"can reinforce it.\" Patient worked on the task for the second half of group, and left after completing it. Patient verbalized appreciation for the intervention.     Attended: 1/2-3/4 attendance  Participation Level: Active Listener and Interactive  Participation Quality: Attentive, Sharing, and Supportive  Affect/Mood: Congruent/Euthymic  Speech: normal rate and normal volume   Thought Content/Processes: Linear  Level of consciousness: Alert  Response: Able to verbalize current knowledge/experience  Outcomes: Progressing towards goal and Actively participated in the group experience    Signature: Naomi Lopez, Licensed Professional Music Therapist (LPMT)

## 2025-07-03 NOTE — GROUP NOTE
Group Therapy Note    Date: 7/3/2025    Group Start Time: 0500  Group End Time: 1600  Group Topic: Healthy Living/Wellness    [facility] 3W Nuria Farley, RN        Group Therapy Note    Attendees: 3/10       Goal- to increase coping skllls  Status After Intervention:  Improved    Participation Level: Active Listener    Participation Quality: Appropriate      Speech:  normal      Thought Process/Content: Logical  Linear      Affective Functioning: Congruent      Mood: euthymic      Level of consciousness:  Alert and Oriented x4      Response to Learning: Progressing to goal      Endings: None Reported    Modes of Intervention: Education, Support, and Socialization      Discipline Responsible: Registered Nurse      Signature:  Nuria Smith, RN

## 2025-07-03 NOTE — GROUP NOTE
Date: 7/3/2025    Group Start Time: 1015  Group End Time: 1050  Group Topic: Music Therapy    [facility] Naomi Jean    Music-Based Jeopardy  Cognitive Skills, Music Analysis, Receptive Music Listening    Patients will be asked to select a category/difficulty level for music-based questions in the style of \"Jeopardy!\". Patients will be asked to identify a song title, artist, movie, TV show, etc. after listening to a recorded version of a song. Patients will choose between competing in teams or working together as a group.    Focus: Building Positive Experiences, Socialization  Goals: Improve/Maintain Cognitive Skills, Increase/Maintain Level of Socialization, Improve Mood, Improve/Maintain Self-Expression, Increase Sensory Stimulation, Promote Reality Orientation    Patient selected categories/difficulty levels for music-based questions, and provided relevant guesses based on recorded music listening. Patient spontaneously socialized with peers/staff and shared related musical knowledge with others. Patient expressed appreciation for group.    Attended: Full attendance  Participation Level: Active Listener and Interactive  Participation Quality: Attentive, Sharing, and Supportive  Affect/Mood: Congruent/Euthymic  Speech: spontaneous, normal rate, and normal volume   Thought Content/Processes: Linear  Level of consciousness: Alert  Response: Able to verbalize current knowledge/experience  Outcomes: Progressing towards goal and Actively participated in the group experience    Signature: Naomi Lopez Licensed Professional Music Therapist (LPMT)

## 2025-07-03 NOTE — GROUP NOTE
Purpose of Group:    To educate patient about supportive community-based resources available post-discharge through [facility].   Intervention/Activities:    Patient participated in an informational group session facilitated by peer supports from [facility]. Peer representatives provided an overview of the services offered, including:   Free daily transportation to and from the facility   Complimentary daily meals for all participants   Access to an Art Room, Fitness Center, and Computer Room   Opportunities for volunteering and social engagement to support reintegration into the community   Patient Response:    Patient was attentive and engaged during the discussion. Asked appropriate questions regarding transportation availability and volunteer options. Demonstrated interest in accessing services that promote structure, socialization, and personal growth in the community post-discharge.   Clinical Impressions:    Patient appears motivated to engage in supportive services that will facilitate recovery and community reintegration. [facility] [place] may offer a stabilizing and enriching environment as part of patient’s discharge planning.   Plan/Recommendations:   Provide patient with [facility] referral and contact information   Encourage follow-up with peer support staff upon discharge   Include [facility] in patient’s discharge plan as a resource for structured community support

## 2025-07-04 PROCEDURE — 1240000000 HC EMOTIONAL WELLNESS R&B

## 2025-07-04 PROCEDURE — 6370000000 HC RX 637 (ALT 250 FOR IP): Performed by: PSYCHIATRY & NEUROLOGY

## 2025-07-04 RX ADMIN — NICOTINE POLACRILEX 4 MG: 4 LOZENGE ORAL at 07:09

## 2025-07-04 RX ADMIN — NICOTINE POLACRILEX 4 MG: 4 LOZENGE ORAL at 13:50

## 2025-07-04 RX ADMIN — NICOTINE POLACRILEX 4 MG: 4 LOZENGE ORAL at 17:37

## 2025-07-04 RX ADMIN — TRAZODONE HYDROCHLORIDE 50 MG: 50 TABLET ORAL at 21:39

## 2025-07-04 RX ADMIN — FOLIC ACID 1 MG: 1 TABLET ORAL at 08:28

## 2025-07-04 RX ADMIN — NICOTINE POLACRILEX 4 MG: 4 LOZENGE ORAL at 11:28

## 2025-07-04 RX ADMIN — Medication 100 MG: at 08:28

## 2025-07-04 RX ADMIN — NICOTINE POLACRILEX 4 MG: 4 LOZENGE ORAL at 19:50

## 2025-07-04 RX ADMIN — NICOTINE POLACRILEX 4 MG: 4 LOZENGE ORAL at 09:17

## 2025-07-04 RX ADMIN — NICOTINE POLACRILEX 4 MG: 4 LOZENGE ORAL at 15:37

## 2025-07-04 RX ADMIN — NICOTINE POLACRILEX 4 MG: 4 LOZENGE ORAL at 21:39

## 2025-07-04 RX ADMIN — LURASIDONE HYDROCHLORIDE 20 MG: 20 TABLET, FILM COATED ORAL at 16:02

## 2025-07-04 RX ADMIN — MULTIVITAMIN TABLET 1 TABLET: TABLET at 08:28
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
## 2025-07-05 PROCEDURE — 6370000000 HC RX 637 (ALT 250 FOR IP): Performed by: PSYCHIATRY & NEUROLOGY

## 2025-07-05 PROCEDURE — 1240000000 HC EMOTIONAL WELLNESS R&B

## 2025-07-05 RX ADMIN — FOLIC ACID 1 MG: 1 TABLET ORAL at 07:53

## 2025-07-05 RX ADMIN — Medication 100 MG: at 07:53

## 2025-07-05 RX ADMIN — NICOTINE POLACRILEX 4 MG: 4 LOZENGE ORAL at 19:46

## 2025-07-05 RX ADMIN — NICOTINE POLACRILEX 4 MG: 4 LOZENGE ORAL at 13:50

## 2025-07-05 RX ADMIN — TRAZODONE HYDROCHLORIDE 50 MG: 50 TABLET ORAL at 21:21

## 2025-07-05 RX ADMIN — NICOTINE POLACRILEX 4 MG: 4 LOZENGE ORAL at 11:39

## 2025-07-05 RX ADMIN — NICOTINE POLACRILEX 4 MG: 4 LOZENGE ORAL at 18:11

## 2025-07-05 RX ADMIN — NICOTINE POLACRILEX 4 MG: 4 LOZENGE ORAL at 21:21

## 2025-07-05 RX ADMIN — MULTIVITAMIN TABLET 1 TABLET: TABLET at 07:53

## 2025-07-05 RX ADMIN — NICOTINE POLACRILEX 4 MG: 4 LOZENGE ORAL at 16:15

## 2025-07-05 RX ADMIN — NICOTINE POLACRILEX 4 MG: 4 LOZENGE ORAL at 07:24

## 2025-07-05 RX ADMIN — LURASIDONE HYDROCHLORIDE 20 MG: 20 TABLET, FILM COATED ORAL at 16:15

## 2025-07-05 RX ADMIN — NICOTINE POLACRILEX 4 MG: 4 LOZENGE ORAL at 09:40

## 2025-07-05 RX ADMIN — NICOTINE POLACRILEX 4 MG: 4 LOZENGE ORAL at 23:24

## 2025-07-05 NOTE — GROUP NOTE
Group Therapy Note    Date: 7/5/2025    Group Start Time: 1215  Group End Time: 1300  Group Topic: Psychoeducation    [facility] 3W Yara Gonzalez    Group Therapy Note    Anxiety Triggers    Patients will be given a list of potential anxiety triggers and asked to rate them 1-10. Patients will then be encouraged to speak about their feelings of anxiety and why these triggers cause anxiety. Patients will then be asked to give potential coping strategies to reduce the anxiety, and will be given other options if none are mentioned.     Goals:   Improve/Maintain Attention to Task, Improve/Maintain Cognitive Skills, Improve/Maintain Identity Development, Improve/Maintain Insight / Self-Awareness, Increase/Maintain Level of Socialization, Improve/Maintain Self-Esteem, Improve/Maintain Self-Expression, and Improve/Maintain Use of Coping Skills       Pt was in attendance for the entirety of group session. Pt spoke about \"how he uses humor to cope with his issues, and that his fiance does not appreciate this coping mechanism.\" Pt was receptive to guidance and suggestions from this writer AEB shaking his head indicating he agreed with the statements. Pt was slightly inappropriate to peer contributions.     Attended: Full attendance  Participation Level: Active Listener and Interactive  Participation Quality: Attentive and Sharing  Affect/Mood: Congruent/Euthymic  Speech: normal rate and normal volume   Insight/Judgement: Poor judgment and Fair insight  Response: Able to verbalize current knowledge/experience, Able to verbalize/acknowledge new learning, Able to retain information, and Capable of insight    Signature: Yara Rodriguez, Psychoeducational Specialist

## 2025-07-05 NOTE — GROUP NOTE
Let's Get Real                                                                      Group Therapy Note     Date: 7/5/2025     Group Start Time: 1500  Group End Time: 1600  Group Topic: Education Group - Inpatient     [facility] 3W Holley Woodward RN           Group Therapy Note     Attendees: 7/17        Patient's Goal:  Let's Get Real and introduction and discussed coping skills, explored feelings , spiritual fitness, discussed alcoholism and 5 stages of grief.     Status After Intervention:  Unchanged     Participation Level: Active Listener and Interactive     Participation Quality: Appropriate        Speech:  normal        Thought Process/Content: Logical        Affective Functioning: Congruent        Mood: euthymic        Level of consciousness:  Alert        Response to Learning: Progressing to goal        Endings: None Reported     Modes of Intervention: Education, Support, Socialization, and Exploration        Discipline Responsible: /Counselor        Signature:  Holley Brown RN

## 2025-07-05 NOTE — GROUP NOTE
Group Therapy Note    Date: 7/5/2025    Group Start Time: 0915  Group End Time: 1100  Group Topic: Psychoeducation    [facility] 3W Yara Gonzalez    Group Therapy Note    End of week check-in     Description:   Patients will be given a paper to complete during this intervention. This template includes questions about patient's weeks: how were you feeling this week, what color would describe your week, etc. Patients will be encouraged to share their findings with peers/staff.     Goals:  Improve/Maintain Attention to Task, Improve/Maintain Cognitive Skills, Improve/Maintain Identity Development, Increase/Maintain Level of Socialization, Improve Mood, Improve/Maintain Self-Esteem, Improve/Maintain Self-Expression, and Improve/Maintain Use of Coping Skills    Patients relayed that they \"wanted to do more activities\", therefore this writer completed another intervention within the session.     Where I'm From Poem    Description:   Pts are directed to complete a mad-dean type paper explaining \"where they are from.\" Pts are then encouraged to rewrite the work on another blank paper and glue it to construction paper, where they are also encouraged to decorate the paper.     Goals:   Improve/Maintain Attention to Task, Improve/Maintain Cognitive Skills, Improve/Maintain Identity Development, Improve/Maintain Insight / Self-Awareness, Improve/Maintain Self-Esteem, Improve/Maintain Self-Expression, and Improve/Maintain Use of Coping Skills       Pt was pleasant and sociable during group session. Pt spoke about \"feeling better than when he was admitted.\" Pt reported his findings with bright affect and positive life outlook. Pt spoke about his fiance and \"how he misses her and would like to go home and see her.\" Pt contributed to peer conversation, and completed the interventions as directed.     Attended: Full attendance  Participation Level: Active Listener

## 2025-07-06 PROCEDURE — 6370000000 HC RX 637 (ALT 250 FOR IP): Performed by: PSYCHIATRY & NEUROLOGY

## 2025-07-06 PROCEDURE — 1240000000 HC EMOTIONAL WELLNESS R&B

## 2025-07-06 RX ADMIN — MULTIVITAMIN TABLET 1 TABLET: TABLET at 07:38

## 2025-07-06 RX ADMIN — NICOTINE POLACRILEX 4 MG: 4 LOZENGE ORAL at 11:40

## 2025-07-06 RX ADMIN — NICOTINE POLACRILEX 4 MG: 4 LOZENGE ORAL at 19:31

## 2025-07-06 RX ADMIN — NICOTINE POLACRILEX 4 MG: 4 LOZENGE ORAL at 17:46

## 2025-07-06 RX ADMIN — NICOTINE POLACRILEX 4 MG: 4 LOZENGE ORAL at 07:38

## 2025-07-06 RX ADMIN — Medication 100 MG: at 07:38

## 2025-07-06 RX ADMIN — FOLIC ACID 1 MG: 1 TABLET ORAL at 07:38

## 2025-07-06 RX ADMIN — NICOTINE POLACRILEX 4 MG: 4 LOZENGE ORAL at 21:33

## 2025-07-06 RX ADMIN — TRAZODONE HYDROCHLORIDE 50 MG: 50 TABLET ORAL at 21:17

## 2025-07-06 RX ADMIN — NICOTINE POLACRILEX 4 MG: 4 LOZENGE ORAL at 09:40

## 2025-07-06 RX ADMIN — LURASIDONE HYDROCHLORIDE 20 MG: 20 TABLET, FILM COATED ORAL at 16:24

## 2025-07-06 RX ADMIN — NICOTINE POLACRILEX 4 MG: 4 LOZENGE ORAL at 15:33

## 2025-07-06 RX ADMIN — NICOTINE POLACRILEX 4 MG: 4 LOZENGE ORAL at 13:40

## 2025-07-06 RX ADMIN — NICOTINE POLACRILEX 4 MG: 4 LOZENGE ORAL at 23:33

## 2025-07-06 RX ADMIN — HYDROXYZINE PAMOATE 50 MG: 25 CAPSULE ORAL at 23:33

## 2025-07-06 NOTE — GROUP NOTE
Group Therapy Note    Date: 7/6/2025    Group Start Time: 1000  Group End Time: 1100  Group Topic: Art Therapy     [facility] 3W Rosa Self, ENRRIQUEW        Group Therapy Note    Attendees: 8       Patient's Goal:  To participate in morning group art therapy.     Notes:  Patient attended the morning group art therapy session. He used pastels, markers, and paper for expression of his feelings visually. Patient's images presented as a bit primitive and usual. He seemed to benefit from art making.     Status After Intervention:  Improved    Participation Level: Interactive    Participation Quality: Appropriate      Speech:  normal      Thought Process/Content: Logical      Affective Functioning: Congruent      Mood: elevated      Level of consciousness:  Alert      Response to Learning: Progressing to goal      Endings: None Reported    Modes of Intervention: Activity      Discipline Responsible: Psychoeducational Specialist      Signature:  Rosa Villanueva MA ATR LPAT

## 2025-07-06 NOTE — CARE COORDINATION
Morning Community Meeting Topics    Suresh Bowman attended the morning community meeting on 7/6/25.    Topics discussed today     [x] Introduction  Day of the week and date  Mask distribution  Current mask requirements  [x]Teams  Explanation of  Green and Blue team criteria  Nurses assigned to each team for today  Explanation about green and blue paper  Date  Patient's Name  Patient's Nurse  Goals  [x] Visitation  Announce the visiting hours for the day  Announce which team is allowed to have visitors for the day  Review any updated Covid 19 requirements for visitors during visitation  Vaccine Card or negative Covid test within 48 hours of visit  State Identification  Patients are reminded to alert the  at least 1 hour before visitation   [x] Unit Orientation  Coffee use  Phone location and etiquette  Shower locations  Washer and dryer location and process  Common area expectations  Staff rounds expectation  [x] Meals   Educate patient to the menu  The patient is encouraged to fill out the menu to get preferences at mealtime  The patient is educated that if they do not fill out the menu, they will get the standard tray  The coffee pot is decaf, patient encouraged to order regular coffee from menu.  Educate patient to the meal process  Patient encouraged to eat snacks provided twice daily  Snacks may stay in patient room     [x] Discharge Process  Discharge expectations  Fill out the survey after discharge   [x] Hygiene  Daily showers encouraged  Showers availability discussed   Daily dressing encouraged  Discussed wearing street clothing  Education provided on where to place linens and clothing  Linens in the hamper  personal clothing does not go into the linen hamper  [x] Group   Patient encouraged to attend group provided  Time of Group Meetings discussed  Gentle reminder that attendance is a Physician order  [x] Movement  Chair exercises completed  Stretching completed  Notes:   Patient participated in

## 2025-07-07 VITALS
HEART RATE: 77 BPM | RESPIRATION RATE: 17 BRPM | DIASTOLIC BLOOD PRESSURE: 77 MMHG | SYSTOLIC BLOOD PRESSURE: 129 MMHG | TEMPERATURE: 98.1 F | OXYGEN SATURATION: 100 %

## 2025-07-07 PROCEDURE — 99239 HOSP IP/OBS DSCHRG MGMT >30: CPT | Performed by: PSYCHIATRY & NEUROLOGY

## 2025-07-07 PROCEDURE — 6370000000 HC RX 637 (ALT 250 FOR IP): Performed by: PSYCHIATRY & NEUROLOGY

## 2025-07-07 RX ORDER — TRAZODONE HYDROCHLORIDE 50 MG/1
50 TABLET ORAL NIGHTLY PRN
Qty: 15 TABLET | Refills: 2 | Status: SHIPPED | OUTPATIENT
Start: 2025-07-07

## 2025-07-07 RX ORDER — LURASIDONE HYDROCHLORIDE 20 MG/1
20 TABLET, FILM COATED ORAL
Qty: 15 TABLET | Refills: 2 | Status: SHIPPED | OUTPATIENT
Start: 2025-07-07

## 2025-07-07 RX ORDER — MULTIVITAMIN WITH IRON
1 TABLET ORAL DAILY
Qty: 15 TABLET | Refills: 3 | Status: SHIPPED | OUTPATIENT
Start: 2025-07-08

## 2025-07-07 RX ADMIN — NICOTINE POLACRILEX 4 MG: 4 LOZENGE ORAL at 08:18

## 2025-07-07 RX ADMIN — FOLIC ACID 1 MG: 1 TABLET ORAL at 08:18

## 2025-07-07 RX ADMIN — NICOTINE POLACRILEX 4 MG: 4 LOZENGE ORAL at 10:34

## 2025-07-07 RX ADMIN — NICOTINE POLACRILEX 4 MG: 4 LOZENGE ORAL at 12:47

## 2025-07-07 RX ADMIN — Medication 100 MG: at 08:18

## 2025-07-07 RX ADMIN — MULTIVITAMIN TABLET 1 TABLET: TABLET at 08:18

## 2025-07-07 NOTE — GROUP NOTE
Date: 7/7/2025    Group Start Time: 0910  Group End Time: 0932  Group Topic: Community Meeting    [facility] Naomi Jean    Stages of Change  Sharifa Analysis/Discussion, Receptive Music Listening    Patients will listen to \"It's Not the Same Anymore\" by Bruce Messer and discuss lyrics, themes, and interpretations derived from the song. Patients will learn about the 5 Stages of Change (pre-contemplation, contemplation, preparation, action, and maintenance), and explore what stage of change they feel like they are in currently.   Focus: Breaking Negative Cycles, Motivation, Processing, Recovery    Goals: Improve/Maintain Attention to Task, Improve/Maintain Identity Development, Improve/Maintain Self-Awareness, Improve/Maintain Self-Expression, Promote Reality Orientation     Patient listened to recorded music and engaged in peer song discussions. Patient resonated with the sharifa \"I'm tired of feeling suppressed,\" and inferred that the song's meaning was about \"recognizing you have a problem, and changing your outlook.\" Patient expressed that he has been through the stages of change related to his alcoholism, and shared the awareness he learned from each relapse. Patient identified that he is currently in the action stage.    Attended: 1/2-3/4 attendance  Participation Level: Active Listener and Interactive  Participation Quality: Attentive, Sharing, and Supportive  Affect/Mood: Congruent/Euthymic  Speech: spontaneous, normal rate, and normal volume   Thought Content/Processes: Linear  Level of consciousness: Alert  Response: Able to verbalize current knowledge/experience and Capable of insight  Outcomes: Successfully internalized the purpose of intervention, Actively participated in the group experience, and Anticipated discharge today    Signature: Naomi Lopez, Licensed Professional Music Therapist (LPMT)

## 2025-07-07 NOTE — PROGRESS NOTES
[facility]  BEHAVIORAL HEALTH FOLLOW-UP NOTE       7/6/2025     Patient was seen and examined in person, Chart reviewed   Patient's case discussed with staff/team    Chief Complaint: Depression    Interim History:     Patient says he is \"doing good\". He said he slept \"pretty good\", and that his appetite was \"pretty good\" too. He said his mood was \"good'. He denied having suicidal or homicidal ideation. He denied having auditory or visual hallucinations. He denied paranoia or other delusions.     Appetite:   [x] Normal/Unchanged  [] Increased  [] Decreased      Sleep:       [x] Normal/Unchanged  [] Fair       [] Poor              Energy:    [x] Normal/Unchanged  [] Increased  [] Decreased        SI [] Present  [x] Absent    HI  []Present  [x] Absent     Aggression:  [] yes  [x] no    Patient is [] able  [] unable to CONTRACT FOR SAFETY     PAST MEDICAL/PSYCHIATRIC HISTORY:   Past Medical History:   Diagnosis Date    Anxiety     Depression     Immune deficiency disorder     Willebrand-Juergens disease (HCC)        FAMILY/SOCIAL HISTORY:  History reviewed. No pertinent family history.  Social History     Socioeconomic History    Marital status: Single     Spouse name: Not on file    Number of children: Not on file    Years of education: Not on file    Highest education level: Not on file   Occupational History    Not on file   Tobacco Use    Smoking status: Former     Current packs/day: 0.25     Average packs/day: 0.3 packs/day for 5.0 years (1.3 ttl pk-yrs)     Types: E-Cigarettes, Cigarettes    Smokeless tobacco: Never   Vaping Use    Vaping status: Every Day   Substance and Sexual Activity    Alcohol use: Yes    Drug use: Not Currently     Types: Other-see comments     Comment: \"maddi\" MDMA    Sexual activity: Not on file   Other Topics Concern    Not on file   Social History Narrative    Not on file     Social Drivers of Health     Financial Resource Strain: Not on file   Food Insecurity: 
               [facility]  BEHAVIORAL HEALTH FOLLOW-UP NOTE       7/5/2025     Patient was seen and examined in person, Chart reviewed   Patient's case discussed with staff/team    Chief Complaint: Depression    Interim History:   Patient said he was \"doing good\". He said he was \"a little stressed out\" because of \"work stuff\". He said he had not worked in a week, and would like to be discharged tomorrow so that he can go back to work. He said his sleep was \"really good\", and that his appetite was \"good\". He said his depression and anxiety were \"low\". He denied having thoughts of suicide, or of harming anyone else. He denied having auditory or visual hallucinations. He denied feeling that others wanted to harm him.         Appetite:   [x] Normal/Unchanged  [] Increased  [] Decreased      Sleep:       [x] Normal/Unchanged  [] Fair       [] Poor              Energy:    [x] Normal/Unchanged  [] Increased  [] Decreased        SI [] Present  [x] Absent    HI  []Present  [x] Absent     Aggression:  [] yes  [x] no    Patient is [] able  [] unable to CONTRACT FOR SAFETY     PAST MEDICAL/PSYCHIATRIC HISTORY:   Past Medical History:   Diagnosis Date    Anxiety     Depression     Immune deficiency disorder     Willebrand-Juergens disease (HCC)        FAMILY/SOCIAL HISTORY:  History reviewed. No pertinent family history.  Social History     Socioeconomic History    Marital status: Single     Spouse name: Not on file    Number of children: Not on file    Years of education: Not on file    Highest education level: Not on file   Occupational History    Not on file   Tobacco Use    Smoking status: Former     Current packs/day: 0.25     Average packs/day: 0.3 packs/day for 5.0 years (1.3 ttl pk-yrs)     Types: E-Cigarettes, Cigarettes    Smokeless tobacco: Never   Vaping Use    Vaping status: Every Day   Substance and Sexual Activity    Alcohol use: Yes    Drug use: Not Currently     Types: Other-see comments     Comment: 
      Behavioral Services  Medicare Certification Upon Admission    I certify that this patient's inpatient psychiatric hospital admission is medically necessary for:    [x] (1) Treatment which could reasonably be expected to improve this patient's condition,       [x] (2) Or for diagnostic study;     AND     [x](2) The inpatient psychiatric services are provided while the individual is under the care of a physician and are included in the individualized plan of care.    Estimated length of stay/service 3-5    Plan for post-hospital care OP    Electronically signed by SALVATORE SLOAN MD on 7/2/2025 at 9:40 AM      
1300: Pt requesting his vital signs be obtained. He reports feeling anxious and overstimulated. Pt scored 3 on CIWA scale for a mild headache and anxiety. Pt states he does not want to take any anti-anxiety medication at this time. Pt encouraged to alert staff if both mental and physical symptoms worsen. Pt agreeable. Will continue to monitor.   1401: Pt agreeable to take Vistaril at this time.   
CLINICAL PHARMACY NOTE: MEDS TO BEDS    Total # of Prescriptions Filled: 3   The following medications were delivered to the patient:  Multivitamin Tab  Lurasidone 20mg Tab   Trazodone 50mg Tab     Additional Documentation:    
Progress Note    Date:7/2/2025       Room:[place] [address]-01  Patient Name:Suresh Bowman     YOB: 2000     Age:24 y.o.    Assessment        Hospital Problems           Last Modified POA    * (Principal) Depression, unspecified 7/1/2025 Yes       Plan:      *Depression, anxiety  - Psychiatry managing     *Alcohol use   -CIWA precautions with Ativan  -Folic acid, multivitamin thiamine  -Seizure precaution     *Elevated blood pressure  - Elevated but within acceptable range; continue to monitor treat as needed     *Rafi Brand syndrome  - Hg 14.7 platelets 289  - Asymptomatic, denies shortness of breath, chest pain headache dizziness fatigue  Monitor PT/INR if indicated     *Mixed hyperlipidemia, cholesterol 211  triglycerides 187  -Diet lifestyle modification     *AST 47  -Expected to trend down likely due to recent alcohol use    *C/o dizziness  - Orthostatic BPs negative  -No history of anemia  -Meclizine 12.5 3 times daily as needed    Additional work up or/and treatment plan may be added today or then after based on clinical progression by other providers or specialists.  Patient will need to follow-up with PCP for chronic disease management.     I have spent greater than 70% of time  spent focused exclusively on this patient ,reviewing  chart, labs/diagnostics, reconciling medications, &  answering questions with patient and discussing plan.       Subjective   Interval History Status: .  Patient seen today for complaints of dizziness.  Patient reports getting up this a.m. he ate breakfast and lunch.  Shortly after lunch began to feel dizzy.  BP this a.m. 125/85, HR 70.  Labs reviewed with no significant findings except cholesterol elevations AST elevated however, patient did admit to drinking 15-20 beers daily.  He is on CIWA precautions.  Orthostatic BPs were negative.  EKG on 7/1 NSR incomplete RBBB.  With , QRS 94  QTc 454. Patient encouraged to keep himself hydrated.  Will order 
Progress Note    Date:7/3/2025       Room:[place] [address]-01  Patient Name:Suresh Bowman     YOB: 2000     Age:24 y.o.    Assessment        Hospital Problems           Last Modified POA    * (Principal) Bipolar depression (HCC) 7/3/2025 Yes       Plan:      *Depression, anxiety  - Psychiatry managing     *Alcohol use   -CIWA precautions with Ativan  -Folic acid, multivitamin thiamine  -Seizure precaution     *Elevated blood pressure  - Elevated but within acceptable range; continue to monitor treat as needed     *Rafi Brand syndrome  - Hg 14.7 platelets 289  - Asymptomatic, denies shortness of breath, chest pain headache dizziness fatigue  Monitor PT/INR if indicated     *Mixed hyperlipidemia, cholesterol 211  triglycerides 187  -Diet lifestyle modification     *AST 47  -Expected to trend down likely due to recent alcohol use     *C/o dizziness  - Orthostatic BPs negative  -No history of anemia  -Meclizine 12.5 3 times daily as needed    Additional work up or/and treatment plan may be added today or then after based on clinical progression by other providers or specialists.  Patient will need to follow-up with PCP for chronic disease management.     I have spent greater than 70% of time  spent focused exclusively on this patient ,reviewing  chart, labs/diagnostics, reconciling medications, &  answering questions with patient and discussing plan.    Subjective   Interval History Status: Patient sitting at dining room table in common area having lunch.  Reevaluated for reports of dizziness.Patient denies dizziness.  No further complaints voiced.  Negative for orthostatic Bps.  Denies any shortness of breath chest pain headache dizziness or palpitations.  Started on meclizine 12.5 3 times daily.  Remains on CIWA precautions with Ativan for alcohol use.        Review of Systems   12 point review of systems reviewed with patient; negative other than as mentioned    Medications   Scheduled Meds:    
Pt reports  no depression @ this time; anxiety level ia #1/10.Pt reports attending all groups, is visible on unit in DR & TV area., Pt reports good appetite, showers daily, and sleeps all night.     
Pt reports depression level is #1/10, anxiety level is #4/10. Pt denies SI/HI/AVH.Pt reports attending all groups., reports showering today, reports good appetite, Pt reports sleeping all night last night.    
Pt requesting PRN trazodone at this time. Administered without difficulty.   
Pt requesting PRN trazodone for sleep. Administered at this time without difficulty.   
Pt requesting PRN vistaril for 5/10 anxiety. Administered at this time without difficulty.   
Spiritual Support Group Note    Number of Participants in Group: 9                  Time: 7805-9045    Goal: Relief from isolation and loneliness             Greta Sharing             Self-understanding and gain insight              Acceptance and belonging            Recognize they are not alone                Socialization             Empowerment       Encouragement    Topic:  [x] Spiritual Wellness and Self Care                  [] Hope                     [x] Connecting with Divine/Others        [] Thankfulness and Gratitude               [x]  Meaningfulness and Purpose               [] Forgiveness               [] Peace               [] Connect to Community      [] Other    Participation Level:   [x] Active Listener   [] Minimal   [] Monopolizing   [x] Interactive   [] No Participation   []  Other:     Attention:   [x] Alert   [] Distractible   [] Drowsy   [] Poor   [] Other:    Manner:   [x] Cooperative   [] Suspicious   [] Withdrawn   [] Guarded   [] Irritable   [] Inhospitable   [] Other:     Others Comments from Group:    
on file   Social History Narrative    Not on file     Social Drivers of Health     Financial Resource Strain: Not on file   Food Insecurity: No Food Insecurity (7/2/2025)    Hunger Vital Sign     Worried About Running Out of Food in the Last Year: Never true     Ran Out of Food in the Last Year: Never true   Transportation Needs: No Transportation Needs (7/2/2025)    PRAPARE - Transportation     Lack of Transportation (Medical): No     Lack of Transportation (Non-Medical): No   Physical Activity: Not on file   Stress: Not on file   Social Connections: Not on file   Intimate Partner Violence: Not on file   Housing Stability: Unknown (7/2/2025)    Housing Stability Vital Sign     Unable to Pay for Housing in the Last Year: No     Number of Times Moved in the Last Year: Not on file     Homeless in the Last Year: No           ROS:  [x] All negative/unchanged except if checked. Explain positive(checked items) below:  [] Constitutional  [] Eyes  [] Ear/Nose/Mouth/Throat  [] Respiratory  [] CV  [] GI  []   [] Musculoskeletal  [] Skin/Breast  [] Neurological  [] Endocrine  [] Heme/Lymph  [] Allergic/Immunologic    Explanation:     MEDICATIONS:    Current Facility-Administered Medications:     nicotine polacrilex (COMMIT) lozenge 4 mg, 4 mg, Oral, Q2H PRN, Alvaro Clarke MD, 4 mg at 07/04/25 0709    lurasidone (LATUDA) tablet 20 mg, 20 mg, Oral, Dinner, Alvaro Clarke MD, 20 mg at 07/03/25 1625    meclizine (ANTIVERT) tablet 12.5 mg, 12.5 mg, Oral, TID PRN, Gissel Elena, APRN - CNP    hydrOXYzine pamoate (VISTARIL) capsule 50 mg, 50 mg, Oral, Q6H PRN, 50 mg at 07/03/25 1401 **OR** hydrOXYzine (VISTARIL) injection 50 mg, 50 mg, IntraMUSCular, Q6H PRN, Alvaro Clarke MD    multivitamin 1 tablet, 1 tablet, Oral, Daily, Alvaro Clarke MD, 1 tablet at 07/04/25 0828    folic acid (FOLVITE) tablet 1 mg, 1 mg, Oral, Daily, Alvaro Clarke MD, 1 mg at 07/04/25 0828    thiamine tablet 100 mg, 100 mg, Oral, Daily, 
LORazepam (ATIVAN) injection 4 mg, 4 mg, IntraMUSCular, Q1H PRN, Alvaro Clarke MD    acetaminophen (TYLENOL) tablet 650 mg, 650 mg, Oral, Q4H PRN, Alvaro Clarke MD    magnesium hydroxide (MILK OF MAGNESIA) 400 MG/5ML suspension 30 mL, 30 mL, Oral, Daily PRN, Alvaro Clarke MD    aluminum & magnesium hydroxide-simethicone (MAALOX PLUS) 200-200-20 MG/5ML suspension 30 mL, 30 mL, Oral, PRN, Alvaro Clarke MD    haloperidol (HALDOL) tablet 5 mg, 5 mg, Oral, Q6H PRN **OR** haloperidol lactate (HALDOL) injection 5 mg, 5 mg, IntraMUSCular, Q6H PRN, Alvaro Clarke MD    benztropine mesylate (COGENTIN) injection 2 mg, 2 mg, IntraMUSCular, BID PRN, Alvaro Clarke MD    traZODone (DESYREL) tablet 50 mg, 50 mg, Oral, Nightly PRN, Alvaro Clarke MD      Examination:  BP (!) 136/94   Pulse 74   Temp 97.3 °F (36.3 °C)   Resp 16   SpO2 100%   Gait - stead  Medication side effects(SE): no    Mental Status Examination:    Level of consciousness:  within normal limits   Appearance:  fair grooming and fair hygiene  Behavior/Motor:  no abnormalities noted  Attitude toward examiner:  attentive  Speech:  normal volume   Mood: depressed  Affect:  blunted  Thought processes:  linear   Thought content:  Suicidal Ideation:  denies suicidal ideation  Cognition:  oriented to person, place, and time   Concentration intact  Insight fair   Judgement fair     ASSESSMENT:   Patient symptoms are:  [] Well controlled  [] Improving  [] Worsening  [] No change      Diagnosis:   Principal Problem:    Bipolar depression (HCC)  Resolved Problems:    * No resolved hospital problems. *      LABS:    Recent Labs     07/01/25 0227   WBC 7.5   HGB 14.7        Recent Labs     07/01/25 0227      K 3.7      CO2 20   BUN 10   CREATININE 1.00   GLUCOSE 93     Recent Labs     07/01/25 0227   BILITOT 0.7   ALKPHOS 111*   AST 47*   ALT 90*     Lab Results   Component Value Date/Time    ROSA Negative 07/01/2025

## 2025-07-07 NOTE — TRANSITION OF CARE
others due to working at [employer]. These compounding factors including childhood trauma, relational stress, active suicidal ideation with plan, and substance misuse contribute to his current psychological instability.    Admission Diagnosis: Depression, unspecified depression type [F32.A]  Depression, unspecified [F32.A]    * No surgery found *    No results found for this visit on 07/01/25.    Immunizations administered during this encounter:   Immunization History   Administered Date(s) Administered    TDaP, ADACEL (age 10y-64y), BOOSTRIX (age 10y+), IM, 0.5mL 01/04/2019     Influenza Vaccination Status: Documentation of patient's or caregiver's refusal of influenza vaccine.    Screening for Metabolic Disorders for Patients on Antipsychotic Medications  (Data obtained from the EMR)    Estimated Body Mass Index  There is no height or weight on file to calculate BMI.      Vital Signs/Blood Pressure  /77   Pulse 77   Temp 98.1 °F (36.7 °C) (Oral)   Resp 17   SpO2 100%      Fasting Blood Glucose or Hemoglobin A1c  No results found for: \"GLU\", \"GLUCPOC\"    No results found for: \"LABA1C\", \"[id number]\"    Discharge Diagnosis: Bipolar depression    Discharge Plan/Destination: home    Discharge Medication List and Instructions:      Medication List        START taking these medications      lurasidone 20 MG Tabs tablet  Commonly known as: LATUDA  Take 1 tablet by mouth Daily with supper     multivitamin Tabs tablet  Take 1 tablet by mouth daily  Start taking on: July 8, 2025     traZODone 50 MG tablet  Commonly known as: DESYREL  Take 1 tablet by mouth nightly as needed for Sleep               Where to Get Your Medications        These medications were sent to [facility] [place], [place] - [address] [phone] - F [phone]  [address] [place] [address]      Phone: [phone]   lurasidone 20 MG Tabs tablet  multivitamin Tabs tablet  traZODone 50 MG tablet

## 2025-07-07 NOTE — GROUP NOTE
Group Therapy Note    Date: 7/7/2025    Group Start Time: 1005  Group End Time: 1100  Group Topic: Art Therapy     [facility] 3W Rosa Self, ENRRIQUEW        Group Therapy Note    Attendees: 8       Patient's Goal:  To participate in morning group art therapy.     Notes:  Patient attended the morning group art therapy. He interacted with socially and was very friendly. Patient enjoyed making art with the materials. He shared art materials and encouraged his peers to be creative.     Status After Intervention:  Improved    Participation Level: Active Listener    Participation Quality: Appropriate      Speech:  normal      Thought Process/Content: Logical      Affective Functioning: Congruent      Mood: elevated      Level of consciousness:  Alert      Response to Learning: Able to verbalize current knowledge/experience      Endings: None Reported    Modes of Intervention: Activity      Discipline Responsible: Psychoeducational Specialist      Signature:  Rosa Villanueva MA ATR LPAT
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
## 2025-07-07 NOTE — DISCHARGE INSTR - DIET
Good nutrition is important when healing from an illness, injury, or surgery.  Follow any nutrition recommendations given to you during your hospital stay.   If you were given an oral nutrition supplement while in the hospital, continue to take this supplement at home.  You can take it with meals, in-between meals, and/or before bedtime. These supplements can be purchased at most local grocery stores, pharmacies, and chain [employer]-stores.   If you have any questions about your diet or nutrition, call the hospital and ask for the dietitian.  Resume diet as tolerated.
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
## 2025-07-07 NOTE — GROUP NOTE
Date: 7/7/2025    Group Start Time: 1155  Group End Time: 1232  Group Topic: Music Therapy    [facility] [place] [facility] [place]    Live Music & Creative Expression  Improvisation, Live Music Listening, Re-creative Instrument Playing/Singing    Patients will be given a songbook and offered the opportunity to select (a) song(s) of their choice for live music listening on [employer]. Patients will be encouraged to sing/move along to the music. Patients will have the option to play a variety of percussion instruments while listening.    Focus: Building Positive Experiences, Creativity, Relaxation   Goals: Maintain Level of Socialization, Improve Mood, Improve Self-Esteem, Improve Self-Expression, Improve Coping Skills, Increase Sensory Stimulation, Promote Reality Orientation     Patient selected several songs for live music listening. Patient socialized with peers/staff throughout group, and appeared bright. Patient spontaneously played a few different percussive instruments to utilize for improvisation, and maintained steady rhythmic patterns independently. Patient expressed appreciation for groups during his admission.     Attended: Full attendance  Participation Level: Active Listener and Interactive  Participation Quality: Attentive, Sharing, and Supportive  Affect/Mood: Congruent/Euthymic and Brightens  Speech: spontaneous, normal rate, and normal volume   Thought Content/Processes: Linear  Level of consciousness: Alert  Response: Able to verbalize current knowledge/experience  Outcomes: Successfully internalized the purpose of intervention, Actively participated in the group experience, and Anticipated discharge today    Signature: Naomi Lopez, Licensed Professional Music Therapist (LPMT)

## 2025-07-20 ENCOUNTER — APPOINTMENT (OUTPATIENT)
Dept: CT IMAGING | Age: 25
End: 2025-07-20
Payer: MEDICAID

## 2025-07-20 ENCOUNTER — HOSPITAL ENCOUNTER (EMERGENCY)
Age: 25
Discharge: HOME | End: 2025-07-20
Payer: MEDICAID

## 2025-07-20 VITALS
SYSTOLIC BLOOD PRESSURE: 148 MMHG | DIASTOLIC BLOOD PRESSURE: 88 MMHG | HEART RATE: 87 BPM | RESPIRATION RATE: 20 BRPM | TEMPERATURE: 98 F | OXYGEN SATURATION: 97 %

## 2025-07-20 DIAGNOSIS — S09.90XA CLOSED HEAD INJURY, INITIAL ENCOUNTER: ICD-10-CM

## 2025-07-20 DIAGNOSIS — R11.0 NAUSEA: ICD-10-CM
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
## 2025-07-20 DIAGNOSIS — R51.9 NONINTRACTABLE HEADACHE, UNSPECIFIED CHRONICITY PATTERN, UNSPECIFIED HEADACHE TYPE: ICD-10-CM

## 2025-07-20 DIAGNOSIS — S00.03XA CONTUSION OF SCALP, INITIAL ENCOUNTER: ICD-10-CM

## 2025-07-20 PROCEDURE — 99284 EMERGENCY DEPT VISIT MOD MDM: CPT

## 2025-07-20 PROCEDURE — 70450 CT HEAD/BRAIN W/O DYE: CPT

## 2025-07-20 PROCEDURE — 93005 ELECTROCARDIOGRAM TRACING: CPT

## 2025-07-20 PROCEDURE — 6370000000 HC RX 637 (ALT 250 FOR IP)

## 2025-07-20 RX ORDER — ONDANSETRON 4 MG/1
4 TABLET, ORALLY DISINTEGRATING ORAL ONCE
Status: COMPLETED | OUTPATIENT
Start: 2025-07-20 | End: 2025-07-20

## 2025-07-20 RX ORDER — ACETAMINOPHEN 500 MG
1000 TABLET ORAL ONCE
Status: COMPLETED | OUTPATIENT
Start: 2025-07-20 | End: 2025-07-20

## 2025-07-20 RX ORDER — ONDANSETRON 4 MG/1
4 TABLET, ORALLY DISINTEGRATING ORAL EVERY 8 HOURS PRN
Qty: 12 TABLET | Refills: 0 | Status: SHIPPED | OUTPATIENT
Start: 2025-07-20

## 2025-07-20 RX ADMIN — ACETAMINOPHEN 1000 MG: 500 TABLET ORAL at 18:11

## 2025-07-20 RX ADMIN — ONDANSETRON 4 MG: 4 TABLET, ORALLY DISINTEGRATING ORAL at 18:11

## 2025-07-20 ASSESSMENT — LIFESTYLE VARIABLES
HOW OFTEN DO YOU HAVE A DRINK CONTAINING ALCOHOL: NEVER
HOW MANY STANDARD DRINKS CONTAINING ALCOHOL DO YOU HAVE ON A TYPICAL DAY: PATIENT DOES NOT DRINK

## 2025-07-20 ASSESSMENT — PAIN SCALES - GENERAL: PAINLEVEL_OUTOF10: 6

## 2025-07-20 ASSESSMENT — PAIN - FUNCTIONAL ASSESSMENT: PAIN_FUNCTIONAL_ASSESSMENT: 0-10

## 2025-07-20 ASSESSMENT — PAIN DESCRIPTION - LOCATION: LOCATION: HEAD

## 2025-07-20 NOTE — ED PROVIDER NOTES
[facility] EMERGENCY DEPARTMENT  eMERGENCYdEPARTMENT eNCOUnter      Pt Name: Suresh Bowman  MRN: [id number]  Birthdate 2000of evaluation: 7/20/2025  Provider:KHANH Metcalf CNP    CHIEF COMPLAINT       Chief Complaint   Patient presents with    Head Injury     Pt states he fell in and hit his head   states he thinks he passed out while leaning over to  boxes          HISTORY OF PRESENT ILLNESS  (Location/Symptom, Timing/Onset, Context/Setting, Quality, Duration, Modifying Factors, Severity.)   Suresh Bowman is a 24 y.o. male  hx of depression, anxiety, who presents to the emergency department with head injury.  Patient states he was in a supply closet at work squatting down to look at the back shelf to get some supplies when he felt his vision went black and then he fell backwards striking the back of his head against the wall, possible LOC.  He does not want to file Worker's Compensation for this. Patient states he has a slight 6 out of 10 gradual onset of a headache, and feels nauseous.  He does mention that on Friday he was riding his skateboard when a bat flew out in front of him and he ducked to try to avoid the bat and he fell off the skateboard and hit the back of his head, he denies any LOC.  Patient does mention he was recently started on a new medication for bipolar, Latuda.  Denies any drug or alcohol usage. Denies any chest pain, shortness of breath, abdominal pain, emesis, diarrhea, fever, chills, recent illness.    HPI    Nursing Notes were reviewed and I agree.    REVIEW OF SYSTEMS    (2-9 systems for level 4, 10 or more for level 5)     Review of Systems   Constitutional:  Negative for activity change, chills and fever.   HENT:  Negative for ear pain and sore throat.    Eyes:  Negative for visual disturbance.   Respiratory:  Negative for cough and shortness of breath.    Cardiovascular:  Negative for chest pain, palpitations and leg swelling.   Gastrointestinal:  Positive for nausea.

## 2025-07-20 NOTE — DISCHARGE INSTRUCTIONS
Take Tylenol as needed for pain control.  Apply ice to scalp contusion intermittently.  Follow-up with PCP.  Return to the emergency department for any new or worsening symptoms.

## 2025-07-21 LAB
EKG ATRIAL RATE: 80 BPM
EKG DIAGNOSIS: NORMAL
EKG P AXIS: 36 DEGREES
EKG P-R INTERVAL: 172 MS
EKG Q-T INTERVAL: 362 MS
EKG QRS DURATION: 98 MS
EKG QTC CALCULATION (BAZETT): 417 MS
EKG R AXIS: 3 DEGREES
EKG T AXIS: 23 DEGREES
EKG VENTRICULAR RATE: 80 BPM

## 2025-07-21 PROCEDURE — 93010 ELECTROCARDIOGRAM REPORT: CPT | Performed by: INTERNAL MEDICINE

## 2025-08-25 ENCOUNTER — OFFICE VISIT (OUTPATIENT)
Dept: FAMILY MEDICINE CLINIC | Age: 25
End: 2025-08-25
Payer: MEDICAID

## 2025-08-25 ENCOUNTER — HOSPITAL ENCOUNTER (OUTPATIENT)
Age: 25
Setting detail: SPECIMEN
Discharge: HOME | End: 2025-08-25
Payer: MEDICAID

## 2025-08-25 VITALS
SYSTOLIC BLOOD PRESSURE: 124 MMHG | OXYGEN SATURATION: 98 % | BODY MASS INDEX: 27.5 KG/M2 | DIASTOLIC BLOOD PRESSURE: 80 MMHG | WEIGHT: 214.2 LBS | HEART RATE: 79 BPM | TEMPERATURE: 97.7 F

## 2025-08-25 DIAGNOSIS — F90.2 ATTENTION DEFICIT HYPERACTIVITY DISORDER (ADHD), COMBINED TYPE: ICD-10-CM

## 2025-08-25 DIAGNOSIS — F31.9 BIPOLAR DEPRESSION (HCC): ICD-10-CM

## 2025-08-25 DIAGNOSIS — F90.2 ATTENTION DEFICIT HYPERACTIVITY DISORDER (ADHD), COMBINED TYPE: Primary | ICD-10-CM

## 2025-08-25 LAB
AMPHET UR QL SCN: NORMAL
BARBITURATES UR QL SCN: NORMAL
BENZODIAZ UR QL SCN: NORMAL
CANNABINOIDS UR QL SCN: NORMAL
COCAINE UR QL SCN: NORMAL
DRUG SCREEN COMMENT UR-IMP: NORMAL
FENTANYL SCREEN, URINE: NORMAL
METHADONE UR QL SCN: NORMAL
OPIATES UR QL SCN: NORMAL
OXYCODONE UR QL SCN: NORMAL
PCP UR QL SCN: NORMAL
PROPOXYPH UR QL SCN: NORMAL

## 2025-08-25 PROCEDURE — G8427 DOCREV CUR MEDS BY ELIG CLIN: HCPCS | Performed by: NURSE PRACTITIONER

## 2025-08-25 PROCEDURE — 80307 DRUG TEST PRSMV CHEM ANLYZR: CPT

## 2025-08-25 PROCEDURE — 99213 OFFICE O/P EST LOW 20 MIN: CPT | Performed by: NURSE PRACTITIONER

## 2025-08-25 PROCEDURE — G8419 CALC BMI OUT NRM PARAM NOF/U: HCPCS | Performed by: NURSE PRACTITIONER

## 2025-08-25 PROCEDURE — 1036F TOBACCO NON-USER: CPT | Performed by: NURSE PRACTITIONER

## 2025-08-25 RX ORDER — LURASIDONE HYDROCHLORIDE 20 MG/1
20 TABLET, FILM COATED ORAL
Qty: 15 TABLET | Refills: 2 | Status: CANCELLED | OUTPATIENT
Start: 2025-08-25

## 2025-08-25 RX ORDER — DEXTROAMPHETAMINE SACCHARATE, AMPHETAMINE ASPARTATE MONOHYDRATE, DEXTROAMPHETAMINE SULFATE AND AMPHETAMINE SULFATE 2.5; 2.5; 2.5; 2.5 MG/1; MG/1; MG/1; MG/1
10 CAPSULE, EXTENDED RELEASE ORAL DAILY
Qty: 30 CAPSULE | Refills: 0 | Status: SHIPPED | OUTPATIENT
Start: 2025-08-25 | End: 2025-09-24

## 2025-08-25 SDOH — ECONOMIC STABILITY: FOOD INSECURITY: WITHIN THE PAST 12 MONTHS, THE FOOD YOU BOUGHT JUST DIDN'T LAST AND YOU DIDN'T HAVE MONEY TO GET MORE.: NEVER TRUE

## 2025-08-25 SDOH — ECONOMIC STABILITY: FOOD INSECURITY: WITHIN THE PAST 12 MONTHS, YOU WORRIED THAT YOUR FOOD WOULD RUN OUT BEFORE YOU GOT MONEY TO BUY MORE.: NEVER TRUE

## 2025-08-25 ASSESSMENT — ENCOUNTER SYMPTOMS
VOMITING: 0
NAUSEA: 0
EYE REDNESS: 0
DIARRHEA: 0
COUGH: 0
EYE DISCHARGE: 0
ABDOMINAL PAIN: 0
CHEST TIGHTNESS: 0
SHORTNESS OF BREATH: 0
WHEEZING: 0
CONSTIPATION: 0
COLOR CHANGE: 0

## 2025-08-31 ENCOUNTER — HOSPITAL ENCOUNTER (EMERGENCY)
Age: 25
Discharge: HOME | End: 2025-08-31
Attending: EMERGENCY MEDICINE
Payer: MEDICAID

## 2025-08-31 ENCOUNTER — APPOINTMENT (OUTPATIENT)
Dept: ULTRASOUND IMAGING | Age: 25
End: 2025-08-31
Payer: MEDICAID

## 2025-08-31 VITALS
BODY MASS INDEX: 27.46 KG/M2 | RESPIRATION RATE: 17 BRPM | HEIGHT: 74 IN | DIASTOLIC BLOOD PRESSURE: 94 MMHG | HEART RATE: 87 BPM | WEIGHT: 214 LBS | SYSTOLIC BLOOD PRESSURE: 152 MMHG | TEMPERATURE: 97.8 F | OXYGEN SATURATION: 97 %

## 2025-08-31 DIAGNOSIS — M79.661 PAIN OF RIGHT CALF: Primary | ICD-10-CM

## 2025-08-31 PROCEDURE — 93971 EXTREMITY STUDY: CPT

## 2025-08-31 PROCEDURE — 99284 EMERGENCY DEPT VISIT MOD MDM: CPT
